# Patient Record
Sex: FEMALE | Race: WHITE | Employment: FULL TIME | URBAN - METROPOLITAN AREA
[De-identification: names, ages, dates, MRNs, and addresses within clinical notes are randomized per-mention and may not be internally consistent; named-entity substitution may affect disease eponyms.]

---

## 2018-08-19 ENCOUNTER — APPOINTMENT (OUTPATIENT)
Dept: ULTRASOUND IMAGING | Age: 63
DRG: 418 | End: 2018-08-19
Payer: COMMERCIAL

## 2018-08-19 ENCOUNTER — HOSPITAL ENCOUNTER (INPATIENT)
Age: 63
LOS: 6 days | Discharge: HOME | DRG: 418 | End: 2018-08-25
Attending: EMERGENCY MEDICINE | Admitting: INTERNAL MEDICINE
Payer: COMMERCIAL

## 2018-08-19 DIAGNOSIS — K80.50 COMMON BILE DUCT STONE: Primary | ICD-10-CM

## 2018-08-19 DIAGNOSIS — B17.9 ACUTE HEPATITIS: ICD-10-CM

## 2018-08-19 PROBLEM — R10.9 ABDOMINAL PAIN: Status: ACTIVE | Noted: 2018-08-19

## 2018-08-19 PROBLEM — K80.20 CALCULUS OF GALLBLADDER: Status: ACTIVE | Noted: 2018-08-19

## 2018-08-19 PROBLEM — R79.89 ABNORMAL LFTS: Status: ACTIVE | Noted: 2018-08-19

## 2018-08-19 LAB
A/G RATIO: 1 (ref 1.1–2.2)
ALBUMIN SERPL-MCNC: 3.9 G/DL (ref 3.4–5)
ALP BLD-CCNC: 115 U/L (ref 40–129)
ALT SERPL-CCNC: 285 U/L (ref 10–40)
ANION GAP SERPL CALCULATED.3IONS-SCNC: 17 MMOL/L (ref 3–16)
AST SERPL-CCNC: 523 U/L (ref 15–37)
BASOPHILS ABSOLUTE: 0.1 K/UL (ref 0–0.2)
BASOPHILS RELATIVE PERCENT: 0.5 %
BILIRUB SERPL-MCNC: 1.5 MG/DL (ref 0–1)
BILIRUBIN URINE: NEGATIVE
BLOOD, URINE: NEGATIVE
BUN BLDV-MCNC: 17 MG/DL (ref 7–20)
CALCIUM SERPL-MCNC: 9.5 MG/DL (ref 8.3–10.6)
CHLORIDE BLD-SCNC: 96 MMOL/L (ref 99–110)
CLARITY: CLEAR
CO2: 22 MMOL/L (ref 21–32)
COLOR: YELLOW
CREAT SERPL-MCNC: 0.6 MG/DL (ref 0.6–1.2)
EOSINOPHILS ABSOLUTE: 0 K/UL (ref 0–0.6)
EOSINOPHILS RELATIVE PERCENT: 0.1 %
GFR AFRICAN AMERICAN: >60
GFR NON-AFRICAN AMERICAN: >60
GLOBULIN: 3.8 G/DL
GLUCOSE BLD-MCNC: 151 MG/DL (ref 70–99)
GLUCOSE BLD-MCNC: 197 MG/DL (ref 70–99)
GLUCOSE BLD-MCNC: 239 MG/DL (ref 70–99)
GLUCOSE BLD-MCNC: 326 MG/DL (ref 70–99)
GLUCOSE URINE: >=1000 MG/DL
HCT VFR BLD CALC: 40 % (ref 36–48)
HEMOGLOBIN: 14 G/DL (ref 12–16)
KETONES, URINE: ABNORMAL MG/DL
LEUKOCYTE ESTERASE, URINE: NEGATIVE
LIPASE: 23 U/L (ref 13–60)
LYMPHOCYTES ABSOLUTE: 0.9 K/UL (ref 1–5.1)
LYMPHOCYTES RELATIVE PERCENT: 6.6 %
MCH RBC QN AUTO: 31.6 PG (ref 26–34)
MCHC RBC AUTO-ENTMCNC: 34.8 G/DL (ref 31–36)
MCV RBC AUTO: 90.7 FL (ref 80–100)
MICROSCOPIC EXAMINATION: ABNORMAL
MONOCYTES ABSOLUTE: 0.3 K/UL (ref 0–1.3)
MONOCYTES RELATIVE PERCENT: 2.4 %
NEUTROPHILS ABSOLUTE: 12.7 K/UL (ref 1.7–7.7)
NEUTROPHILS RELATIVE PERCENT: 90.4 %
NITRITE, URINE: NEGATIVE
PDW BLD-RTO: 13.7 % (ref 12.4–15.4)
PERFORMED ON: ABNORMAL
PH UA: 5.5
PLATELET # BLD: 305 K/UL (ref 135–450)
PMV BLD AUTO: 8.5 FL (ref 5–10.5)
POTASSIUM SERPL-SCNC: 3.9 MMOL/L (ref 3.5–5.1)
PROTEIN UA: NEGATIVE MG/DL
RBC # BLD: 4.41 M/UL (ref 4–5.2)
SODIUM BLD-SCNC: 135 MMOL/L (ref 136–145)
SPECIFIC GRAVITY UA: 1.01
TOTAL PROTEIN: 7.7 G/DL (ref 6.4–8.2)
URINE TYPE: ABNORMAL
UROBILINOGEN, URINE: 0.2 E.U./DL
WBC # BLD: 14.1 K/UL (ref 4–11)

## 2018-08-19 PROCEDURE — 85025 COMPLETE CBC W/AUTO DIFF WBC: CPT

## 2018-08-19 PROCEDURE — 6360000002 HC RX W HCPCS: Performed by: INTERNAL MEDICINE

## 2018-08-19 PROCEDURE — 6370000000 HC RX 637 (ALT 250 FOR IP): Performed by: INTERNAL MEDICINE

## 2018-08-19 PROCEDURE — 6360000002 HC RX W HCPCS: Performed by: EMERGENCY MEDICINE

## 2018-08-19 PROCEDURE — 76705 ECHO EXAM OF ABDOMEN: CPT

## 2018-08-19 PROCEDURE — S0028 INJECTION, FAMOTIDINE, 20 MG: HCPCS | Performed by: INTERNAL MEDICINE

## 2018-08-19 PROCEDURE — 1200000000 HC SEMI PRIVATE

## 2018-08-19 PROCEDURE — 96375 TX/PRO/DX INJ NEW DRUG ADDON: CPT

## 2018-08-19 PROCEDURE — 83036 HEMOGLOBIN GLYCOSYLATED A1C: CPT

## 2018-08-19 PROCEDURE — 94761 N-INVAS EAR/PLS OXIMETRY MLT: CPT

## 2018-08-19 PROCEDURE — 80053 COMPREHEN METABOLIC PANEL: CPT

## 2018-08-19 PROCEDURE — 96374 THER/PROPH/DIAG INJ IV PUSH: CPT

## 2018-08-19 PROCEDURE — 83690 ASSAY OF LIPASE: CPT

## 2018-08-19 PROCEDURE — 6360000002 HC RX W HCPCS: Performed by: NURSE PRACTITIONER

## 2018-08-19 PROCEDURE — 81003 URINALYSIS AUTO W/O SCOPE: CPT

## 2018-08-19 PROCEDURE — 80074 ACUTE HEPATITIS PANEL: CPT

## 2018-08-19 PROCEDURE — 2580000003 HC RX 258: Performed by: EMERGENCY MEDICINE

## 2018-08-19 PROCEDURE — 2500000003 HC RX 250 WO HCPCS: Performed by: INTERNAL MEDICINE

## 2018-08-19 PROCEDURE — 2580000003 HC RX 258: Performed by: INTERNAL MEDICINE

## 2018-08-19 PROCEDURE — 96361 HYDRATE IV INFUSION ADD-ON: CPT

## 2018-08-19 PROCEDURE — 99285 EMERGENCY DEPT VISIT HI MDM: CPT

## 2018-08-19 RX ORDER — LEVOTHYROXINE SODIUM 0.12 MG/1
125 TABLET ORAL DAILY
Status: DISCONTINUED | OUTPATIENT
Start: 2018-08-19 | End: 2018-08-25 | Stop reason: HOSPADM

## 2018-08-19 RX ORDER — LOSARTAN POTASSIUM 100 MG/1
100 TABLET ORAL DAILY
Status: DISCONTINUED | OUTPATIENT
Start: 2018-08-19 | End: 2018-08-25 | Stop reason: HOSPADM

## 2018-08-19 RX ORDER — MORPHINE SULFATE 4 MG/ML
4 INJECTION, SOLUTION INTRAMUSCULAR; INTRAVENOUS EVERY 30 MIN PRN
Status: DISCONTINUED | OUTPATIENT
Start: 2018-08-19 | End: 2018-08-19

## 2018-08-19 RX ORDER — METOPROLOL TARTRATE 50 MG/1
TABLET, FILM COATED ORAL
COMMUNITY
Start: 2018-05-17

## 2018-08-19 RX ORDER — 0.9 % SODIUM CHLORIDE 0.9 %
1000 INTRAVENOUS SOLUTION INTRAVENOUS ONCE
Status: COMPLETED | OUTPATIENT
Start: 2018-08-19 | End: 2018-08-19

## 2018-08-19 RX ORDER — DEXTROSE MONOHYDRATE 50 MG/ML
100 INJECTION, SOLUTION INTRAVENOUS PRN
Status: DISCONTINUED | OUTPATIENT
Start: 2018-08-19 | End: 2018-08-25 | Stop reason: HOSPADM

## 2018-08-19 RX ORDER — ONDANSETRON 2 MG/ML
4 INJECTION INTRAMUSCULAR; INTRAVENOUS EVERY 6 HOURS PRN
Status: DISCONTINUED | OUTPATIENT
Start: 2018-08-19 | End: 2018-08-25 | Stop reason: HOSPADM

## 2018-08-19 RX ORDER — SODIUM CHLORIDE 9 MG/ML
INJECTION, SOLUTION INTRAVENOUS CONTINUOUS
Status: DISCONTINUED | OUTPATIENT
Start: 2018-08-19 | End: 2018-08-20

## 2018-08-19 RX ORDER — SODIUM CHLORIDE 0.9 % (FLUSH) 0.9 %
10 SYRINGE (ML) INJECTION PRN
Status: DISCONTINUED | OUTPATIENT
Start: 2018-08-19 | End: 2018-08-25 | Stop reason: HOSPADM

## 2018-08-19 RX ORDER — ONDANSETRON 2 MG/ML
4 INJECTION INTRAMUSCULAR; INTRAVENOUS ONCE
Status: COMPLETED | OUTPATIENT
Start: 2018-08-19 | End: 2018-08-19

## 2018-08-19 RX ORDER — SODIUM CHLORIDE 0.9 % (FLUSH) 0.9 %
10 SYRINGE (ML) INJECTION EVERY 12 HOURS SCHEDULED
Status: DISCONTINUED | OUTPATIENT
Start: 2018-08-19 | End: 2018-08-25 | Stop reason: HOSPADM

## 2018-08-19 RX ORDER — ACYCLOVIR 800 MG/1
400 TABLET ORAL 3 TIMES DAILY
Status: CANCELLED | OUTPATIENT
Start: 2018-08-19

## 2018-08-19 RX ORDER — LORAZEPAM 2 MG/ML
1 INJECTION INTRAMUSCULAR ONCE
Status: DISCONTINUED | OUTPATIENT
Start: 2018-08-19 | End: 2018-08-19

## 2018-08-19 RX ORDER — DEXTROSE MONOHYDRATE 25 G/50ML
12.5 INJECTION, SOLUTION INTRAVENOUS PRN
Status: DISCONTINUED | OUTPATIENT
Start: 2018-08-19 | End: 2018-08-25 | Stop reason: HOSPADM

## 2018-08-19 RX ORDER — NICOTINE POLACRILEX 4 MG
15 LOZENGE BUCCAL PRN
Status: DISCONTINUED | OUTPATIENT
Start: 2018-08-19 | End: 2018-08-25 | Stop reason: HOSPADM

## 2018-08-19 RX ORDER — MECLIZINE HCL 12.5 MG/1
25 TABLET ORAL 3 TIMES DAILY PRN
Status: DISCONTINUED | OUTPATIENT
Start: 2018-08-19 | End: 2018-08-25 | Stop reason: HOSPADM

## 2018-08-19 RX ORDER — PRAVASTATIN SODIUM 20 MG
TABLET ORAL
Status: ON HOLD | COMMUNITY
Start: 2018-05-13 | End: 2018-08-21 | Stop reason: HOSPADM

## 2018-08-19 RX ORDER — MORPHINE SULFATE 2 MG/ML
2 INJECTION, SOLUTION INTRAMUSCULAR; INTRAVENOUS EVERY 4 HOURS PRN
Status: DISCONTINUED | OUTPATIENT
Start: 2018-08-19 | End: 2018-08-25 | Stop reason: HOSPADM

## 2018-08-19 RX ORDER — METOPROLOL TARTRATE 50 MG/1
50 TABLET, FILM COATED ORAL 2 TIMES DAILY
Status: DISCONTINUED | OUTPATIENT
Start: 2018-08-19 | End: 2018-08-25 | Stop reason: HOSPADM

## 2018-08-19 RX ORDER — LORAZEPAM 2 MG/ML
1 INJECTION INTRAMUSCULAR ONCE
Status: COMPLETED | OUTPATIENT
Start: 2018-08-20 | End: 2018-08-20

## 2018-08-19 RX ADMIN — SODIUM CHLORIDE: 9 INJECTION, SOLUTION INTRAVENOUS at 23:07

## 2018-08-19 RX ADMIN — METOPROLOL TARTRATE 50 MG: 50 TABLET ORAL at 20:39

## 2018-08-19 RX ADMIN — ENOXAPARIN SODIUM 40 MG: 40 INJECTION SUBCUTANEOUS at 14:50

## 2018-08-19 RX ADMIN — MORPHINE SULFATE 4 MG: 4 INJECTION INTRAVENOUS at 10:44

## 2018-08-19 RX ADMIN — INSULIN LISPRO 1 UNITS: 100 INJECTION, SOLUTION INTRAVENOUS; SUBCUTANEOUS at 23:07

## 2018-08-19 RX ADMIN — MORPHINE SULFATE 2 MG: 2 INJECTION, SOLUTION INTRAMUSCULAR; INTRAVENOUS at 22:13

## 2018-08-19 RX ADMIN — ONDANSETRON 4 MG: 2 INJECTION INTRAMUSCULAR; INTRAVENOUS at 10:44

## 2018-08-19 RX ADMIN — SODIUM CHLORIDE 1000 ML: 9 INJECTION, SOLUTION INTRAVENOUS at 10:44

## 2018-08-19 RX ADMIN — FAMOTIDINE 20 MG: 10 INJECTION, SOLUTION INTRAVENOUS at 14:51

## 2018-08-19 RX ADMIN — ONDANSETRON 4 MG: 2 SOLUTION INTRAMUSCULAR; INTRAVENOUS at 20:32

## 2018-08-19 RX ADMIN — SODIUM CHLORIDE: 9 INJECTION, SOLUTION INTRAVENOUS at 14:51

## 2018-08-19 RX ADMIN — FAMOTIDINE 20 MG: 10 INJECTION, SOLUTION INTRAVENOUS at 20:39

## 2018-08-19 ASSESSMENT — PAIN DESCRIPTION - PAIN TYPE
TYPE: ACUTE PAIN
TYPE: ACUTE PAIN

## 2018-08-19 ASSESSMENT — PAIN DESCRIPTION - LOCATION
LOCATION: ABDOMEN
LOCATION: ABDOMEN

## 2018-08-19 ASSESSMENT — PAIN DESCRIPTION - DESCRIPTORS
DESCRIPTORS: CRAMPING
DESCRIPTORS: ACHING;BURNING

## 2018-08-19 ASSESSMENT — PAIN SCALES - GENERAL
PAINLEVEL_OUTOF10: 0
PAINLEVEL_OUTOF10: 6
PAINLEVEL_OUTOF10: 3
PAINLEVEL_OUTOF10: 6
PAINLEVEL_OUTOF10: 0
PAINLEVEL_OUTOF10: 5

## 2018-08-19 ASSESSMENT — PAIN DESCRIPTION - FREQUENCY: FREQUENCY: CONTINUOUS

## 2018-08-19 ASSESSMENT — PAIN DESCRIPTION - ORIENTATION: ORIENTATION: MID;UPPER

## 2018-08-19 NOTE — ED PROVIDER NOTES
[facility]  ED  eMERGENCY dEPARTMENT eNCOUnter        Pt Name: Elise Monreal  MRN: [id number]  [place] 1955  Date of evaluation: 8/19/2018  Provider: Giulia Sanders MD  PCP: Enid Siemens, MD      CHIEF COMPLAINT       Chief Complaint   Patient presents with    Abdominal Pain     pt believes she's having a \"gallbladder attack\" symptoms of abdominal pain and vomiting started around 0500    Emesis       HISTORY OF PRESENT ILLNESS   (Location/Symptom, Timing/Onset, Context/Setting, Quality, Duration, Modifying Factors, Severity)  Note limiting factors. Elise Monreal is a 66 y.o. female  presents to the emergency department with complaint of Abdominal pain. Patient reports pain in the right upper quadrant and midepigastric region. Patient has history of gallstones. Patient's only abdominal surgeries were C-sections. Patient denies any fevers chills. Patient reports nausea with vomiting. Patient denies any change in stooling. Patient denies dysuria urgency or frequency. Patient has otherwise been well. Patient has diabetes with her last hemoglobin A1c at 7. Nursing Notes were all reviewed and agreed with or any disagreements were addressed  in the HPI.     REVIEW OF SYSTEMS    (2-9 systems for level 4, 10 or more for level 5)     Review of Systems  REVIEW OF SYSTEMS    Constitutional:  Denies fever, chills, weight loss or weakness   Eyes:  Denies photophobia or discharge   HENT:  Denies sore throat or ear pain   Respiratory:  Denies cough or shortness of breath   Cardiovascular:  Denies chest pain, palpitations or swelling   GI:  Denies abdominal pain, nausea, vomiting, or diarrhea   Musculoskeletal:  Denies back pain   Skin:  Denies rash   Neurologic:  Denies headache, focal weakness or sensory changes   Endocrine:  Denies polyuria or polydypsia   Lymphatic:  Denies swollen glands   Psychiatric:  Denies depression, suicidal ideation or homicidal ideation   All systems negative except as marked. Positives and Pertinent negatives as per HPI. Except as noted above in the ROS, all other systems were reviewed and negative. PAST MEDICAL HISTORY     Past Medical History:   Diagnosis Date    Diabetes mellitus ([address]     Hypertension     S/P thyroidectomy 2010    Left Throid Lobectomy    Sleep apnea     Thyroid disease     Vertigo          SURGICAL HISTORY       Past Surgical History:   Procedure Laterality Date     SECTION      X2    COLONOSCOPY      CYST REMOVAL  2012    upper back, excision mole lower posterior neck    CYSTOSCOPY  3/6/16    cystoscopy, right ureteral stent placement    PITUITARY SURGERY      benign pituitary tumor         CURRENT MEDICATIONS       Previous Medications    ACYCLOVIR (ZOVIRAX) 400 MG TABLET    Take 400 mg by mouth 3 times daily as needed. HYDROCHLOROTHIAZIDE (HYDRODIURIL) 12.5 MG TABLET    Take 25 mg by mouth daily. KETOROLAC (TORADOL) 10 MG TABLET    Take 1 tablet by mouth every 6 hours as needed for Pain    LEVOTHYROXINE SODIUM    by Does not apply route daily. LOSARTAN (COZAAR) 100 MG TABLET    Take 100 mg by mouth daily. MECLIZINE (ANTIVERT) 12.5 MG TABLET    Take 25 mg by mouth 3 times daily as needed. METFORMIN (GLUCOPHAGE) 1000 MG TABLET    Take 1,000 mg by mouth daily    METOPROLOL TARTRATE (LOPRESSOR) 50 MG TABLET    TAKE ONE TABLET BY MOUTH TWICE A DAY    MULTIPLE VITAMINS-CALCIUM (ONE-A-DAY WOMENS PO)    Take 1 tablet by mouth. PRAVASTATIN (PRAVACHOL) 20 MG TABLET    TAKE ONE TABLET BY MOUTH DAILY    TAMSULOSIN (FLOMAX) 0.4 MG CAPSULE    Take 1 capsule by mouth daily for 14 days    VITAMIN D PO    Take 1 tablet by mouth. ALLERGIES     Nsaids;  Penicillins; Streptomycin; and Sulfa antibiotics    FAMILY HISTORY       Family History   Problem Relation Age of Onset    Arthritis Mother     Thyroid Disease Mother     High Blood Pressure Father     Cancer Maternal Grandmother 8/19/18 1044)   ondansetron (ZOFRAN) injection 4 mg (4 mg Intravenous Given 8/19/18 1044)   0.9 % sodium chloride bolus (0 mLs Intravenous Stopped 8/19/18 1201)       The patient tolerated their visit well. The patient and / or the family were informed of the results of any tests, a time was given to answer questions. FINAL IMPRESSION      1. Common bile duct stone    2.  Acute hepatitis          DISPOSITION/PLAN   DISPOSITION Decision To Admit 08/19/2018 12:34:42 PM           (Please note that portions of this note were completed with a voice recognition program.  Efforts were made to edit the dictations but occasionally words are mis-transcribed.)    Grupo Gupta MD (electronically signed)              Jose Guzman MD  08/19/18 9210

## 2018-08-19 NOTE — H&P
mouth.   Yes Historical Provider, MD   VITAMIN D PO Take 1 tablet by mouth. Yes Historical Provider, MD   acyclovir (ZOVIRAX) 400 MG tablet Take 400 mg by mouth 3 times daily as needed. 12/16/10  Yes Historical Provider, MD   meclizine (ANTIVERT) 12.5 MG tablet Take 25 mg by mouth 3 times daily as needed. Yes Historical Provider, MD   hydrochlorothiazide (HYDRODIURIL) 12.5 MG tablet Take 25 mg by mouth daily. Yes Historical Provider, MD   tamsulosin (FLOMAX) 0.4 MG capsule Take 1 capsule by mouth daily for 14 days 10/16/15 10/30/15  [place]   ketorolac (TORADOL) 10 MG tablet Take 1 tablet by mouth every 6 hours as needed for Pain 10/16/15 10/20/15  CONRAD Schuster       Allergies:  Nsaids; Penicillins; Streptomycin; and Sulfa antibiotics    Social History:      The patient currently lives At home    TOBACCO:   reports that she has never smoked. She has never used smokeless tobacco.  ETOH:   reports that she does not drink alcohol. Family History:       Reviewed in detail and negative for DM, CAD, Cancer, CVA. Positive as follows:    Family History   Problem Relation Age of Onset    Arthritis Mother     Thyroid Disease Mother     High Blood Pressure Father     Cancer Maternal Grandmother     Thyroid Disease Maternal Grandmother     Diabetes Paternal Grandmother     High Blood Pressure Paternal Grandfather     Stroke Paternal Grandfather     Thyroid Disease Other        REVIEW OF SYSTEMS:   Pertinent positives as noted in the HPI. All other systems reviewed and negative. PHYSICAL EXAM PERFORMED:    BP (!) 148/70   Pulse 62   Temp 97.8 °F (36.6 °C) (Oral)   Resp 18   Ht 5' 2\" (1.575 m)   Wt 211 lb (95.7 kg)   LMP 12/16/2005   SpO2 99%   BMI 38.59 kg/m²     General appearance:  No apparent distress, appears stated age and cooperative. Obese  HEENT:  Normal cephalic, atraumatic without obvious deformity. Pupils equal, round, and reactive to light. Extra ocular muscles intact. Conjunctivae/corneas clear. Neck: Supple, with full range of motion. No jugular venous distention. Trachea midline. Respiratory:  Normal respiratory effort. Clear to auscultation, bilaterally without Rales/Wheezes/Rhonchi. Cardiovascular:  Regular rate and rhythm with normal S1/S2 without murmurs, rubs or gallops. Abdomen: Soft,  tender, non-distended with normal bowel sounds. Musculoskeletal:  No clubbing, cyanosis or edema bilaterally. Full range of motion without deformity. Skin: Skin color, texture, turgor normal.  No rashes or lesions. Neurologic:  Neurovascularly intact without any focal sensory/motor deficits. Cranial nerves: II-XII intact, grossly non-focal.  Psychiatric:  Alert and oriented, thought content appropriate, normal insight  Capillary Refill: Brisk,< 3 seconds   Peripheral Pulses: +2 palpable, equal bilaterally       Labs:     Recent Labs      08/19/18   1030   WBC  14.1*   HGB  14.0   HCT  40.0   PLT  305     Recent Labs      08/19/18   1030   NA  135*   K  3.9   CL  96*   CO2  22   BUN  17   CREATININE  0.6   CALCIUM  9.5     Recent Labs      08/19/18   1030   AST  523*   ALT  285*   BILITOT  1.5*   ALKPHOS  115       Urinalysis:      Lab Results   Component Value Date    NITRU Negative 08/19/2018    WBCUA  03/06/2016    BACTERIA 4+ 03/06/2016    RBCUA >100 03/06/2016    BLOODU Negative 08/19/2018    SPECGRAV 1.015 08/19/2018    GLUCOSEU >=1000 08/19/2018       Radiology:     CXR: I have reviewed the CXR with the following interpretation: N/A  EKG:  I have reviewed the EKG with the following interpretation: N/A    US GALLBLADDER RUQ   Preliminary Result   Mildly dilated common bile duct measuring 9 mm in diameter. No evidence of   intrahepatic biliary ductal dilatation. This may be secondary to a common   bile duct stone. However, recommend correlation with LFTs. Cholelithiasis without evidence of acute cholecystitis.       Right intrarenal stones without evidence of

## 2018-08-20 ENCOUNTER — APPOINTMENT (OUTPATIENT)
Dept: MRI IMAGING | Age: 63
DRG: 418 | End: 2018-08-20
Payer: COMMERCIAL

## 2018-08-20 LAB
A/G RATIO: 1 (ref 1.1–2.2)
ACETAMINOPHEN LEVEL: <5 UG/ML (ref 10–30)
ALBUMIN SERPL-MCNC: 3.3 G/DL (ref 3.4–5)
ALP BLD-CCNC: 132 U/L (ref 40–129)
ALT SERPL-CCNC: 759 U/L (ref 10–40)
ANION GAP SERPL CALCULATED.3IONS-SCNC: 13 MMOL/L (ref 3–16)
AST SERPL-CCNC: 886 U/L (ref 15–37)
BASOPHILS ABSOLUTE: 0.1 K/UL (ref 0–0.2)
BASOPHILS RELATIVE PERCENT: 1 %
BILIRUB SERPL-MCNC: 2.5 MG/DL (ref 0–1)
BUN BLDV-MCNC: 13 MG/DL (ref 7–20)
CALCIUM SERPL-MCNC: 8.5 MG/DL (ref 8.3–10.6)
CHLORIDE BLD-SCNC: 104 MMOL/L (ref 99–110)
CO2: 24 MMOL/L (ref 21–32)
CREAT SERPL-MCNC: <0.5 MG/DL (ref 0.6–1.2)
EOSINOPHILS ABSOLUTE: 0 K/UL (ref 0–0.6)
EOSINOPHILS RELATIVE PERCENT: 0.6 %
ESTIMATED AVERAGE GLUCOSE: 159.9 MG/DL
GFR AFRICAN AMERICAN: >60
GFR NON-AFRICAN AMERICAN: >60
GLOBULIN: 3.2 G/DL
GLUCOSE BLD-MCNC: 118 MG/DL (ref 70–99)
GLUCOSE BLD-MCNC: 149 MG/DL (ref 70–99)
GLUCOSE BLD-MCNC: 155 MG/DL (ref 70–99)
GLUCOSE BLD-MCNC: 155 MG/DL (ref 70–99)
GLUCOSE BLD-MCNC: 174 MG/DL (ref 70–99)
GLUCOSE BLD-MCNC: 179 MG/DL (ref 70–99)
HAV IGM SER IA-ACNC: NORMAL
HBA1C MFR BLD: 7.2 %
HCT VFR BLD CALC: 33.6 % (ref 36–48)
HEMOGLOBIN: 11.8 G/DL (ref 12–16)
HEPATITIS B CORE IGM ANTIBODY: NORMAL
HEPATITIS B SURFACE ANTIGEN INTERPRETATION: NORMAL
HEPATITIS C ANTIBODY INTERPRETATION: NORMAL
INR BLD: 1.1 (ref 0.86–1.14)
LYMPHOCYTES ABSOLUTE: 2.4 K/UL (ref 1–5.1)
LYMPHOCYTES RELATIVE PERCENT: 31.8 %
MAGNESIUM: 1.7 MG/DL (ref 1.8–2.4)
MCH RBC QN AUTO: 31.5 PG (ref 26–34)
MCHC RBC AUTO-ENTMCNC: 35.2 G/DL (ref 31–36)
MCV RBC AUTO: 89.5 FL (ref 80–100)
MONOCYTES ABSOLUTE: 0.4 K/UL (ref 0–1.3)
MONOCYTES RELATIVE PERCENT: 5.2 %
NEUTROPHILS ABSOLUTE: 4.6 K/UL (ref 1.7–7.7)
NEUTROPHILS RELATIVE PERCENT: 61.4 %
PDW BLD-RTO: 13.9 % (ref 12.4–15.4)
PERFORMED ON: ABNORMAL
PLATELET # BLD: 285 K/UL (ref 135–450)
PMV BLD AUTO: 7.9 FL (ref 5–10.5)
POTASSIUM REFLEX MAGNESIUM: 3.1 MMOL/L (ref 3.5–5.1)
PROTHROMBIN TIME: 12.5 SEC (ref 9.8–13)
RBC # BLD: 3.76 M/UL (ref 4–5.2)
SODIUM BLD-SCNC: 141 MMOL/L (ref 136–145)
TOTAL PROTEIN: 6.5 G/DL (ref 6.4–8.2)
TSH REFLEX: 1.24 UIU/ML (ref 0.27–4.2)
WBC # BLD: 7.5 K/UL (ref 4–11)

## 2018-08-20 PROCEDURE — 99253 IP/OBS CNSLTJ NEW/EST LOW 45: CPT | Performed by: SURGERY

## 2018-08-20 PROCEDURE — 84155 ASSAY OF PROTEIN SERUM: CPT

## 2018-08-20 PROCEDURE — 6360000002 HC RX W HCPCS: Performed by: NURSE PRACTITIONER

## 2018-08-20 PROCEDURE — 86664 EPSTEIN-BARR NUCLEAR ANTIGEN: CPT

## 2018-08-20 PROCEDURE — 83516 IMMUNOASSAY NONANTIBODY: CPT

## 2018-08-20 PROCEDURE — G0480 DRUG TEST DEF 1-7 CLASSES: HCPCS

## 2018-08-20 PROCEDURE — 80053 COMPREHEN METABOLIC PANEL: CPT

## 2018-08-20 PROCEDURE — 87529 HSV DNA AMP PROBE: CPT

## 2018-08-20 PROCEDURE — 86663 EPSTEIN-BARR ANTIBODY: CPT

## 2018-08-20 PROCEDURE — 94761 N-INVAS EAR/PLS OXIMETRY MLT: CPT

## 2018-08-20 PROCEDURE — S0028 INJECTION, FAMOTIDINE, 20 MG: HCPCS | Performed by: INTERNAL MEDICINE

## 2018-08-20 PROCEDURE — 83735 ASSAY OF MAGNESIUM: CPT

## 2018-08-20 PROCEDURE — 86039 ANTINUCLEAR ANTIBODIES (ANA): CPT

## 2018-08-20 PROCEDURE — 85025 COMPLETE CBC W/AUTO DIFF WBC: CPT

## 2018-08-20 PROCEDURE — 86645 CMV ANTIBODY IGM: CPT

## 2018-08-20 PROCEDURE — 86665 EPSTEIN-BARR CAPSID VCA: CPT

## 2018-08-20 PROCEDURE — 36415 COLL VENOUS BLD VENIPUNCTURE: CPT

## 2018-08-20 PROCEDURE — 84165 PROTEIN E-PHORESIS SERUM: CPT

## 2018-08-20 PROCEDURE — 86038 ANTINUCLEAR ANTIBODIES: CPT

## 2018-08-20 PROCEDURE — 74181 MRI ABDOMEN W/O CONTRAST: CPT

## 2018-08-20 PROCEDURE — 84443 ASSAY THYROID STIM HORMONE: CPT

## 2018-08-20 PROCEDURE — 1200000000 HC SEMI PRIVATE

## 2018-08-20 PROCEDURE — 85610 PROTHROMBIN TIME: CPT

## 2018-08-20 PROCEDURE — 2580000003 HC RX 258: Performed by: INTERNAL MEDICINE

## 2018-08-20 PROCEDURE — 2500000003 HC RX 250 WO HCPCS: Performed by: INTERNAL MEDICINE

## 2018-08-20 PROCEDURE — 6360000002 HC RX W HCPCS: Performed by: INTERNAL MEDICINE

## 2018-08-20 PROCEDURE — 6370000000 HC RX 637 (ALT 250 FOR IP): Performed by: NURSE PRACTITIONER

## 2018-08-20 PROCEDURE — 6370000000 HC RX 637 (ALT 250 FOR IP): Performed by: INTERNAL MEDICINE

## 2018-08-20 RX ORDER — IBUPROFEN 400 MG/1
400 TABLET ORAL ONCE
Status: COMPLETED | OUTPATIENT
Start: 2018-08-20 | End: 2018-08-20

## 2018-08-20 RX ORDER — SODIUM CHLORIDE 9 MG/ML
INJECTION, SOLUTION INTRAVENOUS CONTINUOUS
Status: DISCONTINUED | OUTPATIENT
Start: 2018-08-20 | End: 2018-08-25 | Stop reason: HOSPADM

## 2018-08-20 RX ORDER — DEXTROSE, SODIUM CHLORIDE, AND POTASSIUM CHLORIDE 5; .45; .15 G/100ML; G/100ML; G/100ML
INJECTION INTRAVENOUS CONTINUOUS
Status: DISCONTINUED | OUTPATIENT
Start: 2018-08-20 | End: 2018-08-20

## 2018-08-20 RX ORDER — MAGNESIUM SULFATE IN WATER 40 MG/ML
2 INJECTION, SOLUTION INTRAVENOUS ONCE
Status: COMPLETED | OUTPATIENT
Start: 2018-08-20 | End: 2018-08-20

## 2018-08-20 RX ADMIN — SODIUM CHLORIDE: 9 INJECTION, SOLUTION INTRAVENOUS at 17:47

## 2018-08-20 RX ADMIN — INSULIN LISPRO 1 UNITS: 100 INJECTION, SOLUTION INTRAVENOUS; SUBCUTANEOUS at 10:54

## 2018-08-20 RX ADMIN — INSULIN LISPRO 1 UNITS: 100 INJECTION, SOLUTION INTRAVENOUS; SUBCUTANEOUS at 06:31

## 2018-08-20 RX ADMIN — SODIUM CHLORIDE, PRESERVATIVE FREE 10 ML: 5 INJECTION INTRAVENOUS at 08:01

## 2018-08-20 RX ADMIN — LOSARTAN POTASSIUM 100 MG: 100 TABLET, FILM COATED ORAL at 10:53

## 2018-08-20 RX ADMIN — SODIUM CHLORIDE: 9 INJECTION, SOLUTION INTRAVENOUS at 06:04

## 2018-08-20 RX ADMIN — METOPROLOL TARTRATE 50 MG: 50 TABLET ORAL at 10:53

## 2018-08-20 RX ADMIN — FAMOTIDINE 20 MG: 10 INJECTION, SOLUTION INTRAVENOUS at 20:06

## 2018-08-20 RX ADMIN — IBUPROFEN 400 MG: 400 TABLET ORAL at 20:04

## 2018-08-20 RX ADMIN — SODIUM CHLORIDE, PRESERVATIVE FREE 10 ML: 5 INJECTION INTRAVENOUS at 20:06

## 2018-08-20 RX ADMIN — METOPROLOL TARTRATE 50 MG: 50 TABLET ORAL at 20:06

## 2018-08-20 RX ADMIN — POTASSIUM CHLORIDE, DEXTROSE MONOHYDRATE AND SODIUM CHLORIDE: 150; 5; 450 INJECTION, SOLUTION INTRAVENOUS at 13:27

## 2018-08-20 RX ADMIN — MAGNESIUM SULFATE HEPTAHYDRATE 2 G: 40 INJECTION, SOLUTION INTRAVENOUS at 17:47

## 2018-08-20 RX ADMIN — LORAZEPAM 1 MG: 2 INJECTION, SOLUTION INTRAMUSCULAR; INTRAVENOUS at 08:01

## 2018-08-20 RX ADMIN — INSULIN LISPRO 1 UNITS: 100 INJECTION, SOLUTION INTRAVENOUS; SUBCUTANEOUS at 21:05

## 2018-08-20 ASSESSMENT — PAIN SCALES - GENERAL
PAINLEVEL_OUTOF10: 4
PAINLEVEL_OUTOF10: 4

## 2018-08-20 ASSESSMENT — PAIN DESCRIPTION - DESCRIPTORS: DESCRIPTORS: ACHING

## 2018-08-20 ASSESSMENT — PAIN DESCRIPTION - PAIN TYPE: TYPE: ACUTE PAIN

## 2018-08-20 ASSESSMENT — PAIN DESCRIPTION - LOCATION: LOCATION: HEAD

## 2018-08-20 NOTE — PROGRESS NOTES
Hospitalist Progress Note      PCP: Apurva Little MD    Date of Admission: 8/19/2018    Chief Complaint: Abdominal pain    Hospital Course:      Subjective:   Patient is up in chair, comfortable, not in distress. Denies any abdominal pain, nausea or vomiting. No new event overnight noted. Medications:  Reviewed    Infusion Medications    dextrose 5% and 0.45% NaCl with KCl 20 mEq      dextrose       Scheduled Medications    losartan  100 mg Oral Daily    metoprolol tartrate  50 mg Oral BID    sodium chloride flush  10 mL Intravenous 2 times per day    enoxaparin  40 mg Subcutaneous Daily    famotidine (PEPCID) injection  20 mg Intravenous BID    insulin lispro  0-6 Units Subcutaneous Q4H    levothyroxine  125 mcg Oral Daily     PRN Meds: meclizine, sodium chloride flush, magnesium hydroxide, ondansetron, glucose, dextrose, glucagon (rDNA), dextrose, morphine      Intake/Output Summary (Last 24 hours) at 08/20/18 1222  Last data filed at 08/20/18 7512   Gross per 24 hour   Intake          1970.83 ml   Output              800 ml   Net          1170.83 ml       Physical Exam Performed:    BP (!) 167/68   Pulse 70   Temp 97.6 °F (36.4 °C) (Oral)   Resp 18   Ht 5' 2\" (1.575 m)   Wt 211 lb (95.7 kg)   LMP 12/16/2005   SpO2 97%   BMI 38.59 kg/m²     General appearance: No apparent distress, appears stated age and cooperative. Obese  HEENT: Pupils equal, round, and reactive to light. Conjunctivae/corneas clear. Neck: Supple, with full range of motion. No jugular venous distention. Trachea midline. Respiratory:  Normal respiratory effort. Clear to auscultation, bilaterally without Rales/Wheezes/Rhonchi. Cardiovascular: Regular rate and rhythm with normal S1/S2 without murmurs, rubs or gallops. Abdomen: Soft, non-tender, non-distended with normal bowel sounds. Musculoskeletal: No clubbing, cyanosis or edema bilaterally. Full range of motion without deformity.   Skin: Skin color, texture, turgor normal.  No rashes or lesions. Neurologic:  Neurovascularly intact without any focal sensory/motor deficits. Cranial nerves: II-XII intact, grossly non-focal.  Psychiatric: Alert and oriented, thought content appropriate, normal insight  Capillary Refill: Brisk,< 3 seconds   Peripheral Pulses: +2 palpable, equal bilaterally       Labs:   Recent Labs      08/19/18   1030  08/20/18   0623   WBC  14.1*  7.5   HGB  14.0  11.8*   HCT  40.0  33.6*   PLT  305  285     Recent Labs      08/19/18   1030  08/20/18   0623   NA  135*  141   K  3.9  3.1*   CL  96*  104   CO2  22  24   BUN  17  13   CREATININE  0.6  <0.5*   CALCIUM  9.5  8.5     Recent Labs      08/19/18   1030  08/20/18   0623   AST  523*  886*   ALT  285*  759*   BILITOT  1.5*  2.5*   ALKPHOS  115  132*     Recent Labs      08/20/18   0933   INR  1.10     No results for input(s): CKTOTAL, TROPONINI in the last 72 hours. Urinalysis:    Lab Results   Component Value Date    NITRU Negative 08/19/2018    WBCUA  03/06/2016    BACTERIA 4+ 03/06/2016    RBCUA >100 03/06/2016    BLOODU Negative 08/19/2018    SPECGRAV 1.015 08/19/2018    GLUCOSEU >=1000 08/19/2018       Radiology:  US GALLBLADDER RUQ   Final Result   Mildly dilated common bile duct measuring 9 mm in diameter. No evidence of   intrahepatic biliary ductal dilatation. This may be secondary to a common   bile duct stone. However, recommend correlation with LFTs. Cholelithiasis without evidence of acute cholecystitis. Right intrarenal stones without evidence of hydronephrosis. Hepatic steatosis. MRI ABDOMEN WO CONTRAST MRCP   Final Result   1. Cholelithiasis. No evidence of acute inflammation to suggest acute   cholecystitis. 2. Normal MRCP. No current evidence of biliary ductal dilatation. 3. Hepatic steatosis and left adrenal adenoma incidentally observed.                  Assessment/Plan:    Active Hospital Problems    Diagnosis Date Noted    Abdominal pain [R10.9] 08/19/2018     Priority: High    Common bile duct stone [K80.50]     Calculus of gallbladder [K80.20] 08/19/2018    Abnormal LFTs [R94.5] 08/19/2018    Obesity [E66.9] 03/06/2016    Hypothyroid [E03.9] 03/06/2016    DM (diabetes mellitus) ([facility] [address] [E11.9] 03/06/2016     Acute of no pain, unknown etiology, possible due to choledocholithiasis and cholelithiasis, admit to MedSurg, keep nothing by mouth, symptomatic management with when necessary morphine, GI and general surgery consulted. Clinically improving, continue to monitor.     Abnormal LFT, alkaline phosphatase normal with elevated ALT and AST, ultrasound showed an enlarged common bile duct but no stone noted, check MRCP, follow LFT-worsening, acute hepatitis panel ordered, GI consulted, will follow. Acute hepatitis panel were normal, MRCP noted showing cholelithiasis but no choledocholithiasis, input from Gen. surgery noted, input from GI is pending.     Cholelithiasis without evidence of infection, see plan as above, general surgery consulted, will monitor.     Diabetes mellitus type 2, uncontrolled, started on insulin sliding scale, monitor blood sugar.     Morbid Obesity complicating assessment and treatment. Placing patient at risk for multiple co-morbidities as well as early death and contributing to the patient's presentation. Counseled on weight loss. Body mass index is 38.59 kg/m².     DVT Prophylaxis: Lovenox  Diet: Diet NPO Effective Now  Code Status: Full Code    PT/OT Eval Status: Ambulatory    Dispo - in 2-4 days    Vivian Lui MD

## 2018-08-20 NOTE — CARE COORDINATION
Chart reviewed by  for potential needs at discharge. Pt admitted thru ER on 8/19 with cholelithiasis and enlarged common bile duct. Per nsg documentation, pt is in MRI this morning. Pt being followed by internal medicine. Consults pending with GI and general surgery. No immediate needs identified by this . Please consult case mgmt if needs arise.      Jaqueline Pizano, RN DCP

## 2018-08-20 NOTE — CONSULTS
GI Consult Note      Reason for Consult:  Choledocholithiasis  Requesting Physician:  Darion Leos  Gastroenterologist: Froilan Frias COMPLAINT:  Abdominal pain    History Obtained From:  patient, electronic medical record    HISTORY OF PRESENT ILLNESS:                The patient is a 66 y.o. female with significant past medical history of DM, HTN and thyroid disease who presents with acute onset epigastric pain associated with nausea and emesis. In ED LFTs were noted to be abnormal.  RUQ US demonstrated a dilated CBD w/o IHD in the setting of cholelithiasis. MRCP, however, did not demonstrate the presence of biliary dilatation or bile duct filling defects. LFTs continue to rise though she feels better. Denies personal or FHx of liver disease. There have been no medicationi or supplement changes.   Denies ETOH    Past Medical History:        Diagnosis Date    Diabetes mellitus ([address]     Hypertension     S/P thyroidectomy 2010    Left Throid Lobectomy    Sleep apnea     Thyroid disease     Vertigo      Past Surgical History:        Procedure Laterality Date     SECTION      X2    COLONOSCOPY      CYST REMOVAL  2012    upper back, excision mole lower posterior neck    CYSTOSCOPY  3/6/16    cystoscopy, right ureteral stent placement    PITUITARY SURGERY      benign pituitary tumor     Current Medications:    Current Facility-Administered Medications: dextrose 5 % and 0.45 % NaCl with KCl 20 mEq infusion, , Intravenous, Continuous  magnesium sulfate 2 g in 50 mL IVPB premix, 2 g, Intravenous, Once  losartan (COZAAR) tablet 100 mg, 100 mg, Oral, Daily  metoprolol tartrate (LOPRESSOR) tablet 50 mg, 50 mg, Oral, BID  meclizine (ANTIVERT) tablet 25 mg, 25 mg, Oral, TID PRN  sodium chloride flush 0.9 % injection 10 mL, 10 mL, Intravenous, 2 times per day  sodium chloride flush 0.9 % injection 10 mL, 10 mL, Intravenous, PRN  magnesium hydroxide (MILK OF MAGNESIA) 400 MG/5ML suspension 30 mL, 30 mL,

## 2018-08-20 NOTE — CONSULTS
Department of General Surgery Consult    PATIENT NAME: Moira Penny OF BIRTH: 1955    ADMISSION DATE: 2018 10:04 AM      TODAY'S DATE: 2018    Reason for Consult:  Gallstones    Chief Complaint: Abdominal pain    Requesting Physician:  Megan Carter    HISTORY OF PRESENT ILLNESS:              The patient is a 66 y.o. female who presents with abdominal pain that started yesterday. She has never had this pain before. It is sharp and crampy in the epigastrium and RUQ. She has had associated nausea and vomiting. Denies fever or chills. Has known history of gallstones. She feels better this morning.     Past Medical History:        Diagnosis Date    [facility] mellitus [facility] [address]     Hypertension     S/P thyroidectomy 2010    Left Throid Lobectomy    Sleep apnea     Thyroid disease     Vertigo        Past Surgical History:        Procedure Laterality Date     SECTION      X2    COLONOSCOPY      CYST REMOVAL  2012    upper back, excision mole lower posterior neck    CYSTOSCOPY  3/6/16    cystoscopy, right ureteral stent placement    PITUITARY SURGERY      benign pituitary tumor       Current Medications:   Current Facility-Administered Medications: dextrose 5 % and 0.45 % NaCl with KCl 20 mEq infusion, , Intravenous, Continuous  losartan (COZAAR) tablet 100 mg, 100 mg, Oral, Daily  metoprolol tartrate (LOPRESSOR) tablet 50 mg, 50 mg, Oral, BID  meclizine (ANTIVERT) tablet 25 mg, 25 mg, Oral, TID PRN  sodium chloride flush 0.9 % injection 10 mL, 10 mL, Intravenous, 2 times per day  sodium chloride flush 0.9 % injection 10 mL, 10 mL, Intravenous, PRN  magnesium hydroxide (MILK OF MAGNESIA) 400 MG/5ML suspension 30 mL, 30 mL, Oral, Daily PRN  ondansetron (ZOFRAN) injection 4 mg, 4 mg, Intravenous, Q6H PRN  enoxaparin (LOVENOX) injection 40 mg, 40 mg, Subcutaneous, Daily  glucose (GLUTOSE) 40 % oral gel 15 g, 15 g, Oral, PRN  dextrose 50 % solution 12.5 g, 12.5 g, Intravenous, PRN  glucagon (rDNA) injection 1 mg, 1 mg, Intramuscular, PRN  dextrose 5 % solution, 100 mL/hr, Intravenous, PRN  famotidine (PEPCID) injection 20 mg, 20 mg, Intravenous, BID  insulin lispro (HUMALOG) injection vial 0-6 Units, 0-6 Units, Subcutaneous, Q4H  levothyroxine (SYNTHROID) tablet 125 mcg, 125 mcg, Oral, Daily  morphine injection 2 mg, 2 mg, Intravenous, Q4H PRN  Prior to Admission medications    Medication Sig Start Date End Date Taking? Authorizing Provider   metoprolol tartrate (LOPRESSOR) 50 MG tablet TAKE ONE TABLET BY MOUTH TWICE A DAY 5/17/18  Yes Historical Provider, MD   pravastatin (PRAVACHOL) 20 MG tablet TAKE ONE TABLET BY MOUTH DAILY 5/13/18  Yes Historical Provider, MD   metFORMIN (GLUCOPHAGE) 1000 MG tablet Take 1,000 mg by mouth daily   Yes Historical Provider, MD   LEVOTHYROXINE SODIUM by Does not apply route daily. Yes Historical Provider, MD   losartan (COZAAR) 100 MG tablet Take 100 mg by mouth daily. Yes Historical Provider, MD   Multiple Vitamins-Calcium (ONE-A-DAY WOMENS PO) Take 1 tablet by mouth. Yes Historical Provider, MD   VITAMIN D PO Take 1 tablet by mouth. Yes Historical Provider, MD   acyclovir (ZOVIRAX) 400 MG tablet Take 400 mg by mouth 3 times daily as needed. 12/16/10  Yes Historical Provider, MD   meclizine (ANTIVERT) 12.5 MG tablet Take 25 mg by mouth 3 times daily as needed. Yes Historical Provider, MD   hydrochlorothiazide (HYDRODIURIL) 12.5 MG tablet Take 25 mg by mouth daily. Yes Historical Provider, MD   tamsulosin (FLOMAX) 0.4 MG capsule Take 1 capsule by mouth daily for 14 days 10/16/15 10/30/15  CONRAD Ellis   ketorolac (TORADOL) 10 MG tablet Take 1 tablet by mouth every 6 hours as needed for Pain 10/16/15 10/20/15  CONRAD Ellis        Allergies:  Amlodipine besylate; Naproxen sodium; Nsaids; Penicillins; Streptomycin; and Sulfa antibiotics    Social History:   TOBACCO:   reports that she has never smoked.  She has never used smokeless

## 2018-08-21 LAB
ALBUMIN SERPL-MCNC: 3.5 G/DL (ref 3.4–5)
ALP BLD-CCNC: 141 U/L (ref 40–129)
ALT SERPL-CCNC: 515 U/L (ref 10–40)
ANA INTERPRETATION: ABNORMAL
ANA TITER: ABNORMAL
ANION GAP SERPL CALCULATED.3IONS-SCNC: 12 MMOL/L (ref 3–16)
ANTI-NUCLEAR ANTIBODY (ANA): POSITIVE
AST SERPL-CCNC: 371 U/L (ref 15–37)
BILIRUB SERPL-MCNC: 1 MG/DL (ref 0–1)
BILIRUBIN DIRECT: 0.5 MG/DL (ref 0–0.3)
BILIRUBIN, INDIRECT: 0.5 MG/DL (ref 0–1)
BUN BLDV-MCNC: 11 MG/DL (ref 7–20)
CALCIUM SERPL-MCNC: 8.6 MG/DL (ref 8.3–10.6)
CHLORIDE BLD-SCNC: 106 MMOL/L (ref 99–110)
CO2: 25 MMOL/L (ref 21–32)
CREAT SERPL-MCNC: 0.6 MG/DL (ref 0.6–1.2)
CYTOMEGALOVIRUS IGM ANTIBODY: <8 AU/ML
EPSTEIN BARR VIRUS NUCLEAR AB IGG: <3 U/ML (ref 0–21.9)
EPSTEIN-BARR EARLY ANTIGEN ANTIBODY: <5 U/ML (ref 0–10.9)
EPSTEIN-BARR VCA IGG: <10 U/ML (ref 0–21.9)
EPSTEIN-BARR VCA IGM: <10 U/ML (ref 0–43.9)
F-ACTIN AB IGG: 6 UNITS (ref 0–19)
GFR AFRICAN AMERICAN: >60
GFR NON-AFRICAN AMERICAN: >60
GLUCOSE BLD-MCNC: 107 MG/DL (ref 70–99)
GLUCOSE BLD-MCNC: 154 MG/DL (ref 70–99)
GLUCOSE BLD-MCNC: 168 MG/DL (ref 70–99)
GLUCOSE BLD-MCNC: 171 MG/DL (ref 70–99)
GLUCOSE BLD-MCNC: 214 MG/DL (ref 70–99)
HCT VFR BLD CALC: 35.9 % (ref 36–48)
HEMOGLOBIN: 12.5 G/DL (ref 12–16)
MCH RBC QN AUTO: 31.2 PG (ref 26–34)
MCHC RBC AUTO-ENTMCNC: 34.8 G/DL (ref 31–36)
MCV RBC AUTO: 89.8 FL (ref 80–100)
PDW BLD-RTO: 13.6 % (ref 12.4–15.4)
PERFORMED ON: ABNORMAL
PHOSPHORUS: 2.5 MG/DL (ref 2.5–4.9)
PLATELET # BLD: 285 K/UL (ref 135–450)
PMV BLD AUTO: 8.1 FL (ref 5–10.5)
POTASSIUM SERPL-SCNC: 3.5 MMOL/L (ref 3.5–5.1)
RBC # BLD: 4 M/UL (ref 4–5.2)
SODIUM BLD-SCNC: 143 MMOL/L (ref 136–145)
TOTAL PROTEIN: 6.7 G/DL (ref 6.4–8.2)
WBC # BLD: 6.5 K/UL (ref 4–11)

## 2018-08-21 PROCEDURE — 1200000000 HC SEMI PRIVATE

## 2018-08-21 PROCEDURE — 6370000000 HC RX 637 (ALT 250 FOR IP): Performed by: INTERNAL MEDICINE

## 2018-08-21 PROCEDURE — 80076 HEPATIC FUNCTION PANEL: CPT

## 2018-08-21 PROCEDURE — 85027 COMPLETE CBC AUTOMATED: CPT

## 2018-08-21 PROCEDURE — 94761 N-INVAS EAR/PLS OXIMETRY MLT: CPT

## 2018-08-21 PROCEDURE — 6370000000 HC RX 637 (ALT 250 FOR IP): Performed by: NURSE PRACTITIONER

## 2018-08-21 PROCEDURE — APPSS30 APP SPLIT SHARED TIME 16-30 MINUTES: Performed by: CLINICAL NURSE SPECIALIST

## 2018-08-21 PROCEDURE — 2500000003 HC RX 250 WO HCPCS: Performed by: INTERNAL MEDICINE

## 2018-08-21 PROCEDURE — 80048 BASIC METABOLIC PNL TOTAL CA: CPT

## 2018-08-21 PROCEDURE — 2580000003 HC RX 258: Performed by: INTERNAL MEDICINE

## 2018-08-21 PROCEDURE — S0028 INJECTION, FAMOTIDINE, 20 MG: HCPCS | Performed by: INTERNAL MEDICINE

## 2018-08-21 PROCEDURE — 36415 COLL VENOUS BLD VENIPUNCTURE: CPT

## 2018-08-21 PROCEDURE — 84100 ASSAY OF PHOSPHORUS: CPT

## 2018-08-21 PROCEDURE — 6360000002 HC RX W HCPCS: Performed by: NURSE PRACTITIONER

## 2018-08-21 PROCEDURE — 99231 SBSQ HOSP IP/OBS SF/LOW 25: CPT | Performed by: SURGERY

## 2018-08-21 RX ORDER — LEVOTHYROXINE SODIUM 0.12 MG/1
125 TABLET ORAL DAILY
Qty: 30 TABLET | Refills: 3
Start: 2018-08-22

## 2018-08-21 RX ORDER — HYDRALAZINE HYDROCHLORIDE 25 MG/1
25 TABLET, FILM COATED ORAL EVERY 8 HOURS SCHEDULED
Status: DISCONTINUED | OUTPATIENT
Start: 2018-08-21 | End: 2018-08-23

## 2018-08-21 RX ORDER — HYDROCHLOROTHIAZIDE 25 MG/1
25 TABLET ORAL DAILY
Status: DISCONTINUED | OUTPATIENT
Start: 2018-08-21 | End: 2018-08-25 | Stop reason: HOSPADM

## 2018-08-21 RX ORDER — OXYCODONE HYDROCHLORIDE 5 MG/1
5 TABLET ORAL EVERY 4 HOURS PRN
Status: DISCONTINUED | OUTPATIENT
Start: 2018-08-21 | End: 2018-08-25 | Stop reason: HOSPADM

## 2018-08-21 RX ADMIN — HYDRALAZINE HYDROCHLORIDE 25 MG: 25 TABLET, FILM COATED ORAL at 22:01

## 2018-08-21 RX ADMIN — HYDROCHLOROTHIAZIDE 25 MG: 25 TABLET ORAL at 16:20

## 2018-08-21 RX ADMIN — METOPROLOL TARTRATE 50 MG: 50 TABLET ORAL at 08:36

## 2018-08-21 RX ADMIN — INSULIN LISPRO 1 UNITS: 100 INJECTION, SOLUTION INTRAVENOUS; SUBCUTANEOUS at 08:38

## 2018-08-21 RX ADMIN — INSULIN LISPRO 2 UNITS: 100 INJECTION, SOLUTION INTRAVENOUS; SUBCUTANEOUS at 13:47

## 2018-08-21 RX ADMIN — METFORMIN HYDROCHLORIDE 500 MG: 500 TABLET ORAL at 16:20

## 2018-08-21 RX ADMIN — HYDRALAZINE HYDROCHLORIDE 25 MG: 25 TABLET, FILM COATED ORAL at 16:20

## 2018-08-21 RX ADMIN — INSULIN LISPRO 1 UNITS: 100 INJECTION, SOLUTION INTRAVENOUS; SUBCUTANEOUS at 18:30

## 2018-08-21 RX ADMIN — SODIUM CHLORIDE, PRESERVATIVE FREE 10 ML: 5 INJECTION INTRAVENOUS at 19:56

## 2018-08-21 RX ADMIN — FAMOTIDINE 20 MG: 10 INJECTION, SOLUTION INTRAVENOUS at 08:36

## 2018-08-21 RX ADMIN — LEVOTHYROXINE SODIUM 125 MCG: 125 TABLET ORAL at 06:21

## 2018-08-21 RX ADMIN — METOPROLOL TARTRATE 50 MG: 50 TABLET ORAL at 19:56

## 2018-08-21 RX ADMIN — FAMOTIDINE 20 MG: 10 INJECTION, SOLUTION INTRAVENOUS at 19:57

## 2018-08-21 RX ADMIN — LOSARTAN POTASSIUM 100 MG: 100 TABLET, FILM COATED ORAL at 08:36

## 2018-08-21 RX ADMIN — MORPHINE SULFATE 2 MG: 2 INJECTION, SOLUTION INTRAMUSCULAR; INTRAVENOUS at 12:32

## 2018-08-21 ASSESSMENT — PAIN SCALES - GENERAL
PAINLEVEL_OUTOF10: 7
PAINLEVEL_OUTOF10: 0

## 2018-08-21 NOTE — PROGRESS NOTES
[facility]    PATIENT NAME: Gonzalo Obregon     TODAY'S DATE: 8/21/2018    CHIEF COMPLAINT: upper abd pain    INTERVAL HISTORY/HPI:    Pt reports she was feeling much better, but after eating breakfast, did have some increased abd discomfort. REVIEW OF SYSTEMS:  Pertinent positives and negatives as per interval history section    OBJECTIVE:  VITALS:  BP (!) 162/80   Pulse 62   Temp 97.6 °F (36.4 °C) (Oral)   Resp 14   Ht 5' 2\" (1.575 m)   Wt 211 lb (95.7 kg)   LMP 12/16/2005   SpO2 97%   BMI 38.59 kg/m²     INTAKE/OUTPUT:    I/O last 3 completed shifts: In: 1101.7 [P.O.:480; I.V.:621.7]  Out: 650 [Urine:650]  No intake/output data recorded. CONSTITUTIONAL:  awake and alert  LUNGS:  Respirations easy and unlabored, no crackles or wheezing  CARD:  regular rate and rhythm  ABDOMEN:  normal bowel sounds, soft, non-distended, mild RU  Q tenderness     Data:  CBC:   Recent Labs      08/19/18   1030  08/20/18   0623  08/21/18   0632   WBC  14.1*  7.5  6.5   HGB  14.0  11.8*  12.5   HCT  40.0  33.6*  35.9*   PLT  305  285  285     BMP:    Recent Labs      08/19/18   1030  08/20/18   0623  08/21/18   0632   NA  135*  141  143   K  3.9  3.1*  3.5   CL  96*  104  106   CO2  22  24  25   BUN  17  13  11   CREATININE  0.6  <0.5*  0.6   GLUCOSE  326*  149*  154*     Hepatic:   Recent Labs      08/19/18   1030  08/20/18   0623  08/21/18   0632   AST  523*  886*  371*   ALT  285*  759*  515*   BILITOT  1.5*  2.5*  1.0   ALKPHOS  115  132*  141*     Mag:      Recent Labs      08/20/18   0623   MG  1.70*      Phos:     Recent Labs      08/21/18   0632   PHOS  2.5      INR:   Recent Labs      08/20/18   0933   INR  1.10         ASSESSMENT AND PLAN:  Cholelithiasis with elevated LFTs - possibly pt passed stone.    Diet advanced by GI - discussed with pt that would like to see how she does with lunch prior to d/c given her increase in sx after breakfast.     Dipika Perera   [id number]    Surgery Staff    I have examined this patient and read and agree with the note by Sonny Aguilar CNP from today. Pt tried lunch today and noted increased discomfort. Will keep admitted today and back down to clear liquids. Re-evaluate in AM - if still w/ symptoms, may need lap cholecystectomy with this admission (possibly tomorrow).         [facility] M[facility]

## 2018-08-21 NOTE — PROGRESS NOTES
125 mcg, Oral, Daily  morphine injection 2 mg, 2 mg, Intravenous, Q4H PRN  Allergies:  Amlodipine besylate; Naproxen sodium; Nsaids; Penicillins; Streptomycin; and Sulfa antibiotics    Social History:    Devoid of active TOB or ETOH use  Family History:   [place]  REVIEW OF SYSTEMS:    Positive for that which was noted in the HPI. All other systems reviewed and negative.      PHYSICAL EXAM:    Vitals:    BP (!) 162/90   Pulse 51   Temp 98.4 °F (36.9 °C) (Oral)   Resp 14   Ht 5' 2\" (1.575 m)   Wt 211 lb (95.7 kg)   LMP 12/16/2005   SpO2 99%   BMI 38.59 kg/m²     GEN: awake, alert, conversant   HEENT: PERRLA, clear and moist oropharynx  Neck: supple, no masses, trachea midline, no JVD  Lungs: CTA-B w/o wheezes, rhonchi or rales, good A/E B  Cardiac:  RRR w/o murmurs, rubs or gallops  Abdomen: soft, minimal tenderness in epigastrium, non distended w/o HSM, masses, ascites or bruits, NABs  EXT: no clubbing, cyanosis, edema or asterixis  Skin: no rashes, telangiectasias, lesions  Neuro: grossly non-focal    DATA:    CBC with Differential:    Lab Results   Component Value Date    WBC 6.5 08/21/2018    RBC 4.00 08/21/2018    HGB 12.5 08/21/2018    HCT 35.9 08/21/2018     08/21/2018    MCV 89.8 08/21/2018    MCH 31.2 08/21/2018    MCHC 34.8 08/21/2018    RDW 13.6 08/21/2018    SEGSPCT 67.1 05/29/2013    LYMPHOPCT 31.8 08/20/2018    MONOPCT 5.2 08/20/2018    EOSPCT 0.2 12/18/2010    BASOPCT 1.0 08/20/2018    MONOSABS 0.4 08/20/2018    LYMPHSABS 2.4 08/20/2018    EOSABS 0.0 08/20/2018    BASOSABS 0.1 08/20/2018    DIFFTYPE Auto 05/29/2013     CMP:    Lab Results   Component Value Date     08/21/2018    K 3.5 08/21/2018    K 3.1 08/20/2018     08/21/2018    CO2 25 08/21/2018    BUN 11 08/21/2018    CREATININE 0.6 08/21/2018    GFRAA >60 08/21/2018    GFRAA >60 05/29/2013    AGRATIO 1.0 08/20/2018    LABGLOM >60 08/21/2018    GLUCOSE 154 08/21/2018    PROT 6.7 08/21/2018    PROT 7.8 11/28/2012    LABALBU 3.5 08/21/2018    CALCIUM 8.6 08/21/2018    BILITOT 1.0 08/21/2018    ALKPHOS 141 08/21/2018     08/21/2018     08/21/2018     PT/INR:    Lab Results   Component Value Date    PROTIME 12.5 08/20/2018    INR 1.10 08/20/2018       MRCP:   1. Cholelithiasis.  No evidence of acute inflammation to suggest acute   cholecystitis. 2. Normal MRCP.  No current evidence of biliary ductal dilatation. 3. Hepatic steatosis and left adrenal adenoma incidentally observed. Acute hepatitis panel negative    IMPRESSION/RECOMMENDATIONS:      65yo F with medical history significant for DM, HTN and thyroid disease who was admitted with acute onset abdominal pain, N/V in the setting of a hepatocellular pattern of LFT changes. Hepatitis panel negative. Liver function studies improved. Despite the MRCP clinical presentation highly suggestive of passing a stone. Would recommend elective cholecystectomy  Await additional serologies  1.  Okay for discharge

## 2018-08-22 LAB
A/G RATIO: 1 (ref 1.1–2.2)
ALBUMIN SERPL-MCNC: 3.2 G/DL (ref 3.4–5)
ALBUMIN SERPL-MCNC: 3.3 G/DL (ref 3.1–4.9)
ALP BLD-CCNC: 149 U/L (ref 40–129)
ALPHA-1-GLOBULIN: 0.3 G/DL (ref 0.2–0.4)
ALPHA-2-GLOBULIN: 0.7 G/DL (ref 0.4–1.1)
ALT SERPL-CCNC: 388 U/L (ref 10–40)
ANION GAP SERPL CALCULATED.3IONS-SCNC: 12 MMOL/L (ref 3–16)
AST SERPL-CCNC: 232 U/L (ref 15–37)
BASOPHILS ABSOLUTE: 0.1 K/UL (ref 0–0.2)
BASOPHILS RELATIVE PERCENT: 1.3 %
BETA GLOBULIN: 1.4 G/DL (ref 0.9–1.6)
BILIRUB SERPL-MCNC: 1 MG/DL (ref 0–1)
BILIRUBIN DIRECT: 0.5 MG/DL (ref 0–0.3)
BILIRUBIN, INDIRECT: 0.5 MG/DL (ref 0–1)
BUN BLDV-MCNC: 9 MG/DL (ref 7–20)
CALCIUM SERPL-MCNC: 8.6 MG/DL (ref 8.3–10.6)
CHLORIDE BLD-SCNC: 102 MMOL/L (ref 99–110)
CO2: 26 MMOL/L (ref 21–32)
CREAT SERPL-MCNC: 0.6 MG/DL (ref 0.6–1.2)
EOSINOPHILS ABSOLUTE: 0.2 K/UL (ref 0–0.6)
EOSINOPHILS RELATIVE PERCENT: 3.2 %
GAMMA GLOBULIN: 1.3 G/DL (ref 0.6–1.8)
GFR AFRICAN AMERICAN: >60
GFR NON-AFRICAN AMERICAN: >60
GLOBULIN: 3.1 G/DL
GLUCOSE BLD-MCNC: 115 MG/DL (ref 70–99)
GLUCOSE BLD-MCNC: 125 MG/DL (ref 70–99)
GLUCOSE BLD-MCNC: 140 MG/DL (ref 70–99)
GLUCOSE BLD-MCNC: 140 MG/DL (ref 70–99)
GLUCOSE BLD-MCNC: 176 MG/DL (ref 70–99)
HCT VFR BLD CALC: 35.4 % (ref 36–48)
HEMOGLOBIN: 12.4 G/DL (ref 12–16)
LYMPHOCYTES ABSOLUTE: 2.4 K/UL (ref 1–5.1)
LYMPHOCYTES RELATIVE PERCENT: 34.5 %
MCH RBC QN AUTO: 31.6 PG (ref 26–34)
MCHC RBC AUTO-ENTMCNC: 35 G/DL (ref 31–36)
MCV RBC AUTO: 90.1 FL (ref 80–100)
MONOCYTES ABSOLUTE: 0.4 K/UL (ref 0–1.3)
MONOCYTES RELATIVE PERCENT: 6.1 %
NEUTROPHILS ABSOLUTE: 3.8 K/UL (ref 1.7–7.7)
NEUTROPHILS RELATIVE PERCENT: 54.9 %
PDW BLD-RTO: 13.7 % (ref 12.4–15.4)
PERFORMED ON: ABNORMAL
PLATELET # BLD: 293 K/UL (ref 135–450)
PMV BLD AUTO: 8.1 FL (ref 5–10.5)
POTASSIUM SERPL-SCNC: 3.2 MMOL/L (ref 3.5–5.1)
RBC # BLD: 3.93 M/UL (ref 4–5.2)
SODIUM BLD-SCNC: 140 MMOL/L (ref 136–145)
SPE/IFE INTERPRETATION: NORMAL
TOTAL PROTEIN: 6.3 G/DL (ref 6.4–8.2)
TOTAL PROTEIN: 6.8 G/DL (ref 6.4–8.2)
WBC # BLD: 6.9 K/UL (ref 4–11)

## 2018-08-22 PROCEDURE — 2500000003 HC RX 250 WO HCPCS: Performed by: INTERNAL MEDICINE

## 2018-08-22 PROCEDURE — 36415 COLL VENOUS BLD VENIPUNCTURE: CPT

## 2018-08-22 PROCEDURE — 2580000003 HC RX 258: Performed by: INTERNAL MEDICINE

## 2018-08-22 PROCEDURE — APPSS30 APP SPLIT SHARED TIME 16-30 MINUTES: Performed by: CLINICAL NURSE SPECIALIST

## 2018-08-22 PROCEDURE — 1200000000 HC SEMI PRIVATE

## 2018-08-22 PROCEDURE — 82248 BILIRUBIN DIRECT: CPT

## 2018-08-22 PROCEDURE — 94761 N-INVAS EAR/PLS OXIMETRY MLT: CPT

## 2018-08-22 PROCEDURE — 85025 COMPLETE CBC W/AUTO DIFF WBC: CPT

## 2018-08-22 PROCEDURE — 80053 COMPREHEN METABOLIC PANEL: CPT

## 2018-08-22 PROCEDURE — 2700000000 HC OXYGEN THERAPY PER DAY

## 2018-08-22 PROCEDURE — S0028 INJECTION, FAMOTIDINE, 20 MG: HCPCS | Performed by: INTERNAL MEDICINE

## 2018-08-22 PROCEDURE — 6370000000 HC RX 637 (ALT 250 FOR IP): Performed by: INTERNAL MEDICINE

## 2018-08-22 PROCEDURE — 6370000000 HC RX 637 (ALT 250 FOR IP): Performed by: NURSE PRACTITIONER

## 2018-08-22 PROCEDURE — 99231 SBSQ HOSP IP/OBS SF/LOW 25: CPT | Performed by: SURGERY

## 2018-08-22 RX ORDER — POTASSIUM CHLORIDE 20 MEQ/1
20 TABLET, EXTENDED RELEASE ORAL 2 TIMES DAILY WITH MEALS
Status: DISCONTINUED | OUTPATIENT
Start: 2018-08-22 | End: 2018-08-25 | Stop reason: HOSPADM

## 2018-08-22 RX ORDER — HYDROCODONE BITARTRATE AND ACETAMINOPHEN 5; 325 MG/1; MG/1
1 TABLET ORAL EVERY 6 HOURS PRN
Status: DISCONTINUED | OUTPATIENT
Start: 2018-08-22 | End: 2018-08-22

## 2018-08-22 RX ADMIN — SODIUM CHLORIDE, PRESERVATIVE FREE 10 ML: 5 INJECTION INTRAVENOUS at 20:38

## 2018-08-22 RX ADMIN — METFORMIN HYDROCHLORIDE 500 MG: 500 TABLET ORAL at 18:13

## 2018-08-22 RX ADMIN — LEVOTHYROXINE SODIUM 125 MCG: 125 TABLET ORAL at 06:10

## 2018-08-22 RX ADMIN — FAMOTIDINE 20 MG: 10 INJECTION, SOLUTION INTRAVENOUS at 20:39

## 2018-08-22 RX ADMIN — HYDRALAZINE HYDROCHLORIDE 25 MG: 25 TABLET, FILM COATED ORAL at 20:47

## 2018-08-22 RX ADMIN — SODIUM CHLORIDE, PRESERVATIVE FREE 10 ML: 5 INJECTION INTRAVENOUS at 09:59

## 2018-08-22 RX ADMIN — SODIUM CHLORIDE: 9 INJECTION, SOLUTION INTRAVENOUS at 00:49

## 2018-08-22 RX ADMIN — POTASSIUM CHLORIDE 20 MEQ: 20 TABLET, EXTENDED RELEASE ORAL at 18:13

## 2018-08-22 RX ADMIN — SODIUM CHLORIDE: 9 INJECTION, SOLUTION INTRAVENOUS at 22:58

## 2018-08-22 RX ADMIN — INSULIN LISPRO 1 UNITS: 100 INJECTION, SOLUTION INTRAVENOUS; SUBCUTANEOUS at 20:41

## 2018-08-22 RX ADMIN — FAMOTIDINE 20 MG: 10 INJECTION, SOLUTION INTRAVENOUS at 09:58

## 2018-08-22 RX ADMIN — METOPROLOL TARTRATE 50 MG: 50 TABLET ORAL at 20:39

## 2018-08-22 RX ADMIN — HYDRALAZINE HYDROCHLORIDE 25 MG: 25 TABLET, FILM COATED ORAL at 06:10

## 2018-08-22 RX ADMIN — METOPROLOL TARTRATE 50 MG: 50 TABLET ORAL at 09:58

## 2018-08-22 ASSESSMENT — PAIN SCALES - GENERAL: PAINLEVEL_OUTOF10: 0

## 2018-08-22 NOTE — PROGRESS NOTES
LYMPHSABS 2.4 08/22/2018    EOSABS 0.2 08/22/2018    BASOSABS 0.1 08/22/2018    DIFFTYPE Auto 05/29/2013     CMP:    Lab Results   Component Value Date     08/22/2018    K 3.2 08/22/2018    K 3.1 08/20/2018     08/22/2018    CO2 26 08/22/2018    BUN 9 08/22/2018    CREATININE 0.6 08/22/2018    GFRAA >60 08/22/2018    GFRAA >60 05/29/2013    AGRATIO 1.0 08/22/2018    LABGLOM >60 08/22/2018    GLUCOSE 140 08/22/2018    PROT 6.3 08/22/2018    PROT 7.8 11/28/2012    LABALBU 3.2 08/22/2018    CALCIUM 8.6 08/22/2018    BILITOT 1.0 08/22/2018    ALKPHOS 149 08/22/2018     08/22/2018     08/22/2018     PT/INR:    Lab Results   Component Value Date    PROTIME 12.5 08/20/2018    INR 1.10 08/20/2018       MRCP:   1. Cholelithiasis.  No evidence of acute inflammation to suggest acute   cholecystitis. 2. Normal MRCP.  No current evidence of biliary ductal dilatation. 3. Hepatic steatosis and left adrenal adenoma incidentally observed. Acute hepatitis panel negative    IMPRESSION/RECOMMENDATIONS:      63yo F with medical history significant for DM, HTN and thyroid disease who was admitted with acute onset abdominal pain, N/V in the setting of a hepatocellular pattern of LFT changes. Hepatitis panel negative. Liver function studies improved. Despite the MRCP clinical presentation highly suggestive of passing a stone.   Recommend cholecystectomy prior to discharge  Additional serologies negative

## 2018-08-22 NOTE — PLAN OF CARE
Problem: Falls - Risk of:  Goal: Will remain free from falls  Will remain free from falls   Outcome: Ongoing  Pt ambulating around room independently with steady gait. Bed in lowest, locked position, side rails up X2, call light within reach. Will continue to monitor.

## 2018-08-22 NOTE — PROGRESS NOTES
hydronephrosis. Hepatic steatosis. MRI ABDOMEN WO CONTRAST MRCP   Final Result   1. Cholelithiasis. No evidence of acute inflammation to suggest acute   cholecystitis. 2. Normal MRCP. No current evidence of biliary ductal dilatation. 3. Hepatic steatosis and left adrenal adenoma incidentally observed. Assessment/Plan:    Active Hospital Problems    Diagnosis Date Noted    Abdominal pain [R10.9] 08/19/2018     Priority: High    Common bile duct stone [K80.50]     Calculus of gallbladder [K80.20] 08/19/2018    Abnormal LFTs [R94.5] 08/19/2018    Obesity [E66.9] 03/06/2016    Hypothyroid [E03.9] 03/06/2016    DM [facility]di[facility] [E11.9] 03/06/2016     Acute of no pain, unknown etiology, possible due to choledocholithiasis and cholelithiasis, admit to MedSurg, keep nothing by mouth, symptomatic management with when necessary morphine, GI and general surgery consulted. The patient clinically improved initially, after advancement of diet she noted more pain, Further plan for cholecystectomy as per surgery. Possible plan for cholecystectomy during this admission as per surgery.     Abnormal LFT, alkaline phosphatase normal with elevated ALT and AST, ultrasound showed an enlarged common bile duct but no stone noted, check MRCP, follow LFT-worsening, acute hepatitis panel ordered, GI consulted, will follow. Acute hepatitis panel were normal, MRCP noted showing cholelithiasis but no choledocholithiasis, input from Gen. surgery noted. LFTs are improving, accurate hepatitis panel were unremarkable, GI input noted, recommended advance diet.   Patient could not tolerate advancement of diet, further plan as per general surgery.     Cholelithiasis without evidence of infection, see plan as above, general surgery consulted, will monitor.     Diabetes mellitus type 2, uncontrolled, started on insulin sliding scale, monitor blood sugar.     Morbid Obesity complicating assessment

## 2018-08-22 NOTE — PLAN OF CARE
Problem: Nausea/Vomiting:  Goal: Absence of nausea/vomiting  Absence of nausea/vomiting   As long as patient is NPO, she does not have nausea/vomiting.

## 2018-08-22 NOTE — CARE COORDINATION
Case Management/Follow up:    Chart reviewed for length of stay. Hospital day # 3  Unit:  C5    Diagnosis and current status as per MD progress: Abdominal pain 2/2 cholelithiasis - Followed by Internal Medicine, GI, General surgery. Per MD 8/21 symptomatic management and advancement of diet with anticipated UP cholecystectomy. Anticipated d/c date: 8/24 with diet toleration     Expected plan for discharge: Home     Potential barriers: None     No needs identified by DCP. Please contact should needs arise.      Henrique Piedra RN

## 2018-08-23 ENCOUNTER — ANESTHESIA (OUTPATIENT)
Dept: OPERATING ROOM | Age: 63
DRG: 418 | End: 2018-08-23
Payer: COMMERCIAL

## 2018-08-23 ENCOUNTER — APPOINTMENT (OUTPATIENT)
Dept: GENERAL RADIOLOGY | Age: 63
DRG: 418 | End: 2018-08-23
Payer: COMMERCIAL

## 2018-08-23 ENCOUNTER — ANESTHESIA EVENT (OUTPATIENT)
Dept: OPERATING ROOM | Age: 63
DRG: 418 | End: 2018-08-23
Payer: COMMERCIAL

## 2018-08-23 VITALS
OXYGEN SATURATION: 100 % | RESPIRATION RATE: 32 BRPM | DIASTOLIC BLOOD PRESSURE: 58 MMHG | SYSTOLIC BLOOD PRESSURE: 110 MMHG

## 2018-08-23 LAB
ALBUMIN SERPL-MCNC: 3.2 G/DL (ref 3.4–5)
ALP BLD-CCNC: 132 U/L (ref 40–129)
ALT SERPL-CCNC: 285 U/L (ref 10–40)
AST SERPL-CCNC: 131 U/L (ref 15–37)
BILIRUB SERPL-MCNC: 1 MG/DL (ref 0–1)
BILIRUBIN DIRECT: 0.4 MG/DL (ref 0–0.3)
BILIRUBIN, INDIRECT: 0.6 MG/DL (ref 0–1)
GLUCOSE BLD-MCNC: 144 MG/DL (ref 70–99)
GLUCOSE BLD-MCNC: 153 MG/DL (ref 70–99)
GLUCOSE BLD-MCNC: 98 MG/DL (ref 70–99)
PERFORMED ON: ABNORMAL
PERFORMED ON: ABNORMAL
PERFORMED ON: NORMAL
TOTAL PROTEIN: 6.3 G/DL (ref 6.4–8.2)

## 2018-08-23 PROCEDURE — 7100000000 HC PACU RECOVERY - FIRST 15 MIN: Performed by: SURGERY

## 2018-08-23 PROCEDURE — 3600000012 HC SURGERY LEVEL 2 ADDTL 15MIN: Performed by: SURGERY

## 2018-08-23 PROCEDURE — 6360000004 HC RX CONTRAST MEDICATION: Performed by: SURGERY

## 2018-08-23 PROCEDURE — 7100000001 HC PACU RECOVERY - ADDTL 15 MIN: Performed by: SURGERY

## 2018-08-23 PROCEDURE — BF131ZZ FLUOROSCOPY OF GALLBLADDER AND BILE DUCTS USING LOW OSMOLAR CONTRAST: ICD-10-PCS | Performed by: SURGERY

## 2018-08-23 PROCEDURE — 2700000000 HC OXYGEN THERAPY PER DAY

## 2018-08-23 PROCEDURE — 2580000003 HC RX 258: Performed by: INTERNAL MEDICINE

## 2018-08-23 PROCEDURE — 6370000000 HC RX 637 (ALT 250 FOR IP): Performed by: INTERNAL MEDICINE

## 2018-08-23 PROCEDURE — 0FT44ZZ RESECTION OF GALLBLADDER, PERCUTANEOUS ENDOSCOPIC APPROACH: ICD-10-PCS | Performed by: SURGERY

## 2018-08-23 PROCEDURE — C1773 RET DEV, INSERTABLE: HCPCS | Performed by: SURGERY

## 2018-08-23 PROCEDURE — 2500000003 HC RX 250 WO HCPCS: Performed by: NURSE ANESTHETIST, CERTIFIED REGISTERED

## 2018-08-23 PROCEDURE — 3700000000 HC ANESTHESIA ATTENDED CARE: Performed by: SURGERY

## 2018-08-23 PROCEDURE — 2709999900 HC NON-CHARGEABLE SUPPLY: Performed by: SURGERY

## 2018-08-23 PROCEDURE — S0028 INJECTION, FAMOTIDINE, 20 MG: HCPCS | Performed by: INTERNAL MEDICINE

## 2018-08-23 PROCEDURE — 3700000001 HC ADD 15 MINUTES (ANESTHESIA): Performed by: SURGERY

## 2018-08-23 PROCEDURE — 2580000003 HC RX 258: Performed by: NURSE ANESTHETIST, CERTIFIED REGISTERED

## 2018-08-23 PROCEDURE — 6360000002 HC RX W HCPCS: Performed by: NURSE ANESTHETIST, CERTIFIED REGISTERED

## 2018-08-23 PROCEDURE — 88304 TISSUE EXAM BY PATHOLOGIST: CPT

## 2018-08-23 PROCEDURE — 6360000002 HC RX W HCPCS: Performed by: ANESTHESIOLOGY

## 2018-08-23 PROCEDURE — 2500000003 HC RX 250 WO HCPCS: Performed by: INTERNAL MEDICINE

## 2018-08-23 PROCEDURE — 3600000002 HC SURGERY LEVEL 2 BASE: Performed by: SURGERY

## 2018-08-23 PROCEDURE — 3209999900 FLUORO FOR SURGICAL PROCEDURES

## 2018-08-23 PROCEDURE — 47563 LAPARO CHOLECYSTECTOMY/GRAPH: CPT | Performed by: SURGERY

## 2018-08-23 PROCEDURE — 2580000003 HC RX 258: Performed by: SURGERY

## 2018-08-23 PROCEDURE — 6360000002 HC RX W HCPCS: Performed by: INTERNAL MEDICINE

## 2018-08-23 PROCEDURE — 1200000000 HC SEMI PRIVATE

## 2018-08-23 PROCEDURE — 74300 X-RAY BILE DUCTS/PANCREAS: CPT

## 2018-08-23 PROCEDURE — 80076 HEPATIC FUNCTION PANEL: CPT

## 2018-08-23 PROCEDURE — 94761 N-INVAS EAR/PLS OXIMETRY MLT: CPT

## 2018-08-23 PROCEDURE — 2780000010 HC IMPLANT OTHER: Performed by: SURGERY

## 2018-08-23 PROCEDURE — 36415 COLL VENOUS BLD VENIPUNCTURE: CPT

## 2018-08-23 PROCEDURE — 6370000000 HC RX 637 (ALT 250 FOR IP): Performed by: NURSE PRACTITIONER

## 2018-08-23 PROCEDURE — 2500000003 HC RX 250 WO HCPCS: Performed by: ANESTHESIOLOGY

## 2018-08-23 RX ORDER — LIDOCAINE HYDROCHLORIDE 20 MG/ML
INJECTION, SOLUTION INFILTRATION; PERINEURAL PRN
Status: DISCONTINUED | OUTPATIENT
Start: 2018-08-23 | End: 2018-08-23 | Stop reason: SDUPTHER

## 2018-08-23 RX ORDER — OXYCODONE HYDROCHLORIDE AND ACETAMINOPHEN 5; 325 MG/1; MG/1
2 TABLET ORAL PRN
Status: DISCONTINUED | OUTPATIENT
Start: 2018-08-23 | End: 2018-08-23 | Stop reason: HOSPADM

## 2018-08-23 RX ORDER — MORPHINE SULFATE 4 MG/ML
4 INJECTION, SOLUTION INTRAMUSCULAR; INTRAVENOUS
Status: DISCONTINUED | OUTPATIENT
Start: 2018-08-23 | End: 2018-08-25 | Stop reason: HOSPADM

## 2018-08-23 RX ORDER — HYDRALAZINE HYDROCHLORIDE 20 MG/ML
5 INJECTION INTRAMUSCULAR; INTRAVENOUS EVERY 30 MIN PRN
Status: DISCONTINUED | OUTPATIENT
Start: 2018-08-23 | End: 2018-08-23 | Stop reason: HOSPADM

## 2018-08-23 RX ORDER — HYDRALAZINE HYDROCHLORIDE 25 MG/1
50 TABLET, FILM COATED ORAL EVERY 8 HOURS SCHEDULED
Status: DISCONTINUED | OUTPATIENT
Start: 2018-08-23 | End: 2018-08-25 | Stop reason: HOSPADM

## 2018-08-23 RX ORDER — ONDANSETRON 2 MG/ML
4 INJECTION INTRAMUSCULAR; INTRAVENOUS EVERY 30 MIN PRN
Status: DISCONTINUED | OUTPATIENT
Start: 2018-08-23 | End: 2018-08-23 | Stop reason: HOSPADM

## 2018-08-23 RX ORDER — BUPIVACAINE HYDROCHLORIDE 5 MG/ML
INJECTION, SOLUTION PERINEURAL PRN
Status: DISCONTINUED | OUTPATIENT
Start: 2018-08-23 | End: 2018-08-23 | Stop reason: HOSPADM

## 2018-08-23 RX ORDER — DIPHENHYDRAMINE HYDROCHLORIDE 50 MG/ML
6.25 INJECTION INTRAMUSCULAR; INTRAVENOUS
Status: DISCONTINUED | OUTPATIENT
Start: 2018-08-23 | End: 2018-08-23 | Stop reason: HOSPADM

## 2018-08-23 RX ORDER — PROPOFOL 10 MG/ML
INJECTION, EMULSION INTRAVENOUS PRN
Status: DISCONTINUED | OUTPATIENT
Start: 2018-08-23 | End: 2018-08-23 | Stop reason: SDUPTHER

## 2018-08-23 RX ORDER — SUCCINYLCHOLINE CHLORIDE 20 MG/ML
INJECTION INTRAMUSCULAR; INTRAVENOUS PRN
Status: DISCONTINUED | OUTPATIENT
Start: 2018-08-23 | End: 2018-08-23 | Stop reason: SDUPTHER

## 2018-08-23 RX ORDER — FENTANYL CITRATE 50 UG/ML
INJECTION, SOLUTION INTRAMUSCULAR; INTRAVENOUS PRN
Status: DISCONTINUED | OUTPATIENT
Start: 2018-08-23 | End: 2018-08-23 | Stop reason: SDUPTHER

## 2018-08-23 RX ORDER — MORPHINE SULFATE 2 MG/ML
2 INJECTION, SOLUTION INTRAMUSCULAR; INTRAVENOUS
Status: DISCONTINUED | OUTPATIENT
Start: 2018-08-23 | End: 2018-08-25 | Stop reason: HOSPADM

## 2018-08-23 RX ORDER — SODIUM CHLORIDE, SODIUM LACTATE, POTASSIUM CHLORIDE, AND CALCIUM CHLORIDE .6; .31; .03; .02 G/100ML; G/100ML; G/100ML; G/100ML
IRRIGANT IRRIGATION PRN
Status: DISCONTINUED | OUTPATIENT
Start: 2018-08-23 | End: 2018-08-23 | Stop reason: HOSPADM

## 2018-08-23 RX ORDER — SODIUM CHLORIDE, SODIUM LACTATE, POTASSIUM CHLORIDE, CALCIUM CHLORIDE 600; 310; 30; 20 MG/100ML; MG/100ML; MG/100ML; MG/100ML
INJECTION, SOLUTION INTRAVENOUS CONTINUOUS PRN
Status: DISCONTINUED | OUTPATIENT
Start: 2018-08-23 | End: 2018-08-23 | Stop reason: SDUPTHER

## 2018-08-23 RX ORDER — ONDANSETRON 2 MG/ML
INJECTION INTRAMUSCULAR; INTRAVENOUS PRN
Status: DISCONTINUED | OUTPATIENT
Start: 2018-08-23 | End: 2018-08-23 | Stop reason: SDUPTHER

## 2018-08-23 RX ORDER — VECURONIUM BROMIDE 1 MG/ML
INJECTION, POWDER, LYOPHILIZED, FOR SOLUTION INTRAVENOUS PRN
Status: DISCONTINUED | OUTPATIENT
Start: 2018-08-23 | End: 2018-08-23 | Stop reason: SDUPTHER

## 2018-08-23 RX ORDER — MIDAZOLAM HYDROCHLORIDE 1 MG/ML
INJECTION INTRAMUSCULAR; INTRAVENOUS PRN
Status: DISCONTINUED | OUTPATIENT
Start: 2018-08-23 | End: 2018-08-23 | Stop reason: SDUPTHER

## 2018-08-23 RX ORDER — MEPERIDINE HYDROCHLORIDE 50 MG/ML
12.5 INJECTION INTRAMUSCULAR; INTRAVENOUS; SUBCUTANEOUS EVERY 5 MIN PRN
Status: DISCONTINUED | OUTPATIENT
Start: 2018-08-23 | End: 2018-08-23 | Stop reason: HOSPADM

## 2018-08-23 RX ORDER — HYDRALAZINE HYDROCHLORIDE 20 MG/ML
10 INJECTION INTRAMUSCULAR; INTRAVENOUS EVERY 6 HOURS PRN
Status: DISCONTINUED | OUTPATIENT
Start: 2018-08-23 | End: 2018-08-25 | Stop reason: HOSPADM

## 2018-08-23 RX ORDER — OXYCODONE HYDROCHLORIDE AND ACETAMINOPHEN 5; 325 MG/1; MG/1
1 TABLET ORAL PRN
Status: DISCONTINUED | OUTPATIENT
Start: 2018-08-23 | End: 2018-08-23 | Stop reason: HOSPADM

## 2018-08-23 RX ORDER — LABETALOL HYDROCHLORIDE 5 MG/ML
5 INJECTION, SOLUTION INTRAVENOUS
Status: DISCONTINUED | OUTPATIENT
Start: 2018-08-23 | End: 2018-08-23 | Stop reason: HOSPADM

## 2018-08-23 RX ORDER — DEXAMETHASONE SODIUM PHOSPHATE 4 MG/ML
INJECTION, SOLUTION INTRA-ARTICULAR; INTRALESIONAL; INTRAMUSCULAR; INTRAVENOUS; SOFT TISSUE PRN
Status: DISCONTINUED | OUTPATIENT
Start: 2018-08-23 | End: 2018-08-23 | Stop reason: SDUPTHER

## 2018-08-23 RX ADMIN — SODIUM CHLORIDE, POTASSIUM CHLORIDE, SODIUM LACTATE AND CALCIUM CHLORIDE: 600; 310; 30; 20 INJECTION, SOLUTION INTRAVENOUS at 17:36

## 2018-08-23 RX ADMIN — HYDRALAZINE HYDROCHLORIDE 50 MG: 25 TABLET, FILM COATED ORAL at 23:30

## 2018-08-23 RX ADMIN — PHENYLEPHRINE HYDROCHLORIDE 200 MCG: 10 INJECTION INTRAVENOUS at 17:52

## 2018-08-23 RX ADMIN — FAMOTIDINE 20 MG: 10 INJECTION, SOLUTION INTRAVENOUS at 09:24

## 2018-08-23 RX ADMIN — SODIUM CHLORIDE, PRESERVATIVE FREE 10 ML: 5 INJECTION INTRAVENOUS at 09:23

## 2018-08-23 RX ADMIN — HYDROMORPHONE HYDROCHLORIDE 0.5 MG: 1 INJECTION, SOLUTION INTRAMUSCULAR; INTRAVENOUS; SUBCUTANEOUS at 19:35

## 2018-08-23 RX ADMIN — FENTANYL CITRATE 150 MCG: 50 INJECTION INTRAMUSCULAR; INTRAVENOUS at 17:44

## 2018-08-23 RX ADMIN — FAMOTIDINE 20 MG: 10 INJECTION, SOLUTION INTRAVENOUS at 23:31

## 2018-08-23 RX ADMIN — ONDANSETRON 4 MG: 2 SOLUTION INTRAMUSCULAR; INTRAVENOUS at 22:10

## 2018-08-23 RX ADMIN — PROPOFOL 200 MG: 10 INJECTION, EMULSION INTRAVENOUS at 17:44

## 2018-08-23 RX ADMIN — FENTANYL CITRATE 100 MCG: 50 INJECTION INTRAMUSCULAR; INTRAVENOUS at 18:03

## 2018-08-23 RX ADMIN — MIDAZOLAM HYDROCHLORIDE 2 MG: 2 INJECTION, SOLUTION INTRAMUSCULAR; INTRAVENOUS at 17:42

## 2018-08-23 RX ADMIN — LOSARTAN POTASSIUM 100 MG: 100 TABLET, FILM COATED ORAL at 09:24

## 2018-08-23 RX ADMIN — LEVOTHYROXINE SODIUM 125 MCG: 125 TABLET ORAL at 06:10

## 2018-08-23 RX ADMIN — HYDRALAZINE HYDROCHLORIDE 10 MG: 20 INJECTION INTRAMUSCULAR; INTRAVENOUS at 12:46

## 2018-08-23 RX ADMIN — VECURONIUM BROMIDE 1 MG: 10 INJECTION, POWDER, LYOPHILIZED, FOR SOLUTION INTRAVENOUS at 17:44

## 2018-08-23 RX ADMIN — HYDROMORPHONE HYDROCHLORIDE 0.5 MG: 1 INJECTION, SOLUTION INTRAMUSCULAR; INTRAVENOUS; SUBCUTANEOUS at 19:24

## 2018-08-23 RX ADMIN — SODIUM CHLORIDE, POTASSIUM CHLORIDE, SODIUM LACTATE AND CALCIUM CHLORIDE: 600; 310; 30; 20 INJECTION, SOLUTION INTRAVENOUS at 18:14

## 2018-08-23 RX ADMIN — LIDOCAINE HYDROCHLORIDE 40 MG: 20 INJECTION, SOLUTION INFILTRATION; PERINEURAL at 17:44

## 2018-08-23 RX ADMIN — ONDANSETRON 4 MG: 2 INJECTION INTRAMUSCULAR; INTRAVENOUS at 17:44

## 2018-08-23 RX ADMIN — METOPROLOL TARTRATE 50 MG: 50 TABLET ORAL at 09:24

## 2018-08-23 RX ADMIN — VECURONIUM BROMIDE 5 MG: 10 INJECTION, POWDER, LYOPHILIZED, FOR SOLUTION INTRAVENOUS at 17:49

## 2018-08-23 RX ADMIN — DEXAMETHASONE SODIUM PHOSPHATE 4 MG: 4 INJECTION, SOLUTION INTRAMUSCULAR; INTRAVENOUS at 17:44

## 2018-08-23 RX ADMIN — INSULIN LISPRO 1 UNITS: 100 INJECTION, SOLUTION INTRAVENOUS; SUBCUTANEOUS at 23:32

## 2018-08-23 RX ADMIN — HYDRALAZINE HYDROCHLORIDE 25 MG: 25 TABLET, FILM COATED ORAL at 06:10

## 2018-08-23 RX ADMIN — POTASSIUM CHLORIDE 20 MEQ: 20 TABLET, EXTENDED RELEASE ORAL at 23:30

## 2018-08-23 RX ADMIN — HYDROMORPHONE HYDROCHLORIDE 0.5 MG: 1 INJECTION, SOLUTION INTRAMUSCULAR; INTRAVENOUS; SUBCUTANEOUS at 19:48

## 2018-08-23 RX ADMIN — SUGAMMADEX 200 MG: 100 INJECTION, SOLUTION INTRAVENOUS at 18:59

## 2018-08-23 RX ADMIN — POTASSIUM CHLORIDE 20 MEQ: 20 TABLET, EXTENDED RELEASE ORAL at 09:24

## 2018-08-23 RX ADMIN — SODIUM CHLORIDE: 9 INJECTION, SOLUTION INTRAVENOUS at 19:42

## 2018-08-23 RX ADMIN — LABETALOL HYDROCHLORIDE 5 MG: 5 INJECTION, SOLUTION INTRAVENOUS at 19:43

## 2018-08-23 RX ADMIN — FENTANYL CITRATE 50 MCG: 50 INJECTION INTRAMUSCULAR; INTRAVENOUS at 18:50

## 2018-08-23 RX ADMIN — SUCCINYLCHOLINE CHLORIDE 100 MG: 20 INJECTION, SOLUTION INTRAMUSCULAR; INTRAVENOUS at 17:44

## 2018-08-23 RX ADMIN — METOPROLOL TARTRATE 50 MG: 50 TABLET ORAL at 23:30

## 2018-08-23 ASSESSMENT — PAIN DESCRIPTION - LOCATION
LOCATION: BACK
LOCATION: ABDOMEN

## 2018-08-23 ASSESSMENT — PULMONARY FUNCTION TESTS
PIF_VALUE: 23
PIF_VALUE: 0
PIF_VALUE: 18
PIF_VALUE: 24
PIF_VALUE: 24
PIF_VALUE: 5
PIF_VALUE: 23
PIF_VALUE: 25
PIF_VALUE: 18
PIF_VALUE: 18
PIF_VALUE: 25
PIF_VALUE: 24
PIF_VALUE: 17
PIF_VALUE: 23
PIF_VALUE: 25
PIF_VALUE: 17
PIF_VALUE: 22
PIF_VALUE: 24
PIF_VALUE: 2
PIF_VALUE: 25
PIF_VALUE: 19
PIF_VALUE: 24
PIF_VALUE: 20
PIF_VALUE: 23
PIF_VALUE: 19
PIF_VALUE: 4
PIF_VALUE: 2
PIF_VALUE: 22
PIF_VALUE: 0
PIF_VALUE: 24
PIF_VALUE: 24
PIF_VALUE: 17
PIF_VALUE: 17
PIF_VALUE: 1
PIF_VALUE: 18
PIF_VALUE: 18
PIF_VALUE: 20
PIF_VALUE: 17
PIF_VALUE: 18
PIF_VALUE: 6
PIF_VALUE: 25
PIF_VALUE: 24
PIF_VALUE: 18
PIF_VALUE: 24
PIF_VALUE: 25
PIF_VALUE: 24
PIF_VALUE: 23
PIF_VALUE: 24
PIF_VALUE: 30
PIF_VALUE: 23
PIF_VALUE: 17
PIF_VALUE: 19
PIF_VALUE: 18
PIF_VALUE: 18
PIF_VALUE: 24
PIF_VALUE: 23
PIF_VALUE: 25
PIF_VALUE: 18
PIF_VALUE: 18
PIF_VALUE: 19
PIF_VALUE: 24
PIF_VALUE: 23
PIF_VALUE: 17
PIF_VALUE: 0
PIF_VALUE: 19
PIF_VALUE: 4
PIF_VALUE: 18
PIF_VALUE: 23
PIF_VALUE: 24
PIF_VALUE: 23
PIF_VALUE: 24
PIF_VALUE: 4
PIF_VALUE: 5
PIF_VALUE: 17
PIF_VALUE: 23
PIF_VALUE: 24
PIF_VALUE: 25
PIF_VALUE: 18
PIF_VALUE: 24
PIF_VALUE: 25
PIF_VALUE: 18
PIF_VALUE: 26
PIF_VALUE: 17

## 2018-08-23 ASSESSMENT — PAIN DESCRIPTION - PAIN TYPE
TYPE: SURGICAL PAIN
TYPE: ACUTE PAIN

## 2018-08-23 ASSESSMENT — PAIN DESCRIPTION - ORIENTATION: ORIENTATION: RIGHT

## 2018-08-23 ASSESSMENT — PAIN DESCRIPTION - FREQUENCY: FREQUENCY: CONTINUOUS

## 2018-08-23 ASSESSMENT — PAIN DESCRIPTION - DESCRIPTORS: DESCRIPTORS: PATIENT UNABLE TO DESCRIBE

## 2018-08-23 ASSESSMENT — PAIN DESCRIPTION - ONSET: ONSET: AWAKENED FROM SLEEP

## 2018-08-23 ASSESSMENT — PAIN SCALES - GENERAL
PAINLEVEL_OUTOF10: 5
PAINLEVEL_OUTOF10: 0
PAINLEVEL_OUTOF10: 5
PAINLEVEL_OUTOF10: 7
PAINLEVEL_OUTOF10: 3

## 2018-08-23 NOTE — ANESTHESIA PRE PROCEDURE
(urinary tract infection) N39.0    Renal calculus or stone N20.0    Pyelonephritis N12    Obesity E66.9    DM [facility]diabetes mellitus) ([facility] E11.9    Hypothyroid E03.9    Abdominal pain R10.9    Calculus of gallbladder K80.20    Abnormal LFTs R94.5    Common bile duct stone K80.50       Past Medical History:        Diagnosis Date    Diabetes mellitus ([facility]     Hypertension     S/P thyroidectomy 2010    Left Throid Lobectomy    Sleep apnea     Thyroid disease     Vertigo        Past Surgical History:        Procedure Laterality Date     SECTION      X2    COLONOSCOPY      CYST REMOVAL  2012    upper back, excision mole lower posterior neck    CYSTOSCOPY  3/6/16    cystoscopy, right ureteral stent placement    PITUITARY SURGERY      benign pituitary tumor       Social History:    Social History   Substance Use Topics    Smoking status: Never Smoker    Smokeless tobacco: Never Used    Alcohol use No                                Counseling given: Not Answered      Vital Signs (Current):   Vitals:    18 1312 18 1425 18 1451 18 1452   BP: (!) 180/88 (!) 196/72 (!) 171/102 (!) 188/105   Pulse: 66 65 64 66   Resp:  16 16    Temp:  98 °F (36.7 °C) 97.6 °F (36.4 °C)    TempSrc:  Oral Oral    SpO2:   99%    Weight:       Height:                                                  BP Readings from Last 3 Encounters:   18 (!) 188/105   16 163/76   16 121/52       NPO Status:                                                                                 BMI:   Wt Readings from Last 3 Encounters:   18 211 lb (95.7 kg)   16 207 lb (93.9 kg)   16 211 lb (95.7 kg)     Body mass index is 38.59 kg/m².     CBC:   Lab Results   Component Value Date    WBC 6.9 2018    RBC 3.93 2018    HGB 12.4 2018    HCT 35.4 2018    MCV 90.1 2018    RDW 13.7 2018     2018       CMP:   Lab Results   Component

## 2018-08-23 NOTE — PROGRESS NOTES
with normal S1/S2 without murmurs, rubs or gallops. Abdomen: Soft, non-tender, non-distended with normal bowel sounds. Musculoskeletal: No clubbing, cyanosis or edema bilaterally. Full range of motion without deformity. Skin: Skin color, texture, turgor normal.  No rashes or lesions. Neurologic:  Neurovascularly intact without any focal sensory/motor deficits. Cranial nerves: II-XII intact, grossly non-focal.  Psychiatric: Alert and oriented, thought content appropriate, normal insight  Capillary Refill: Brisk,< 3 seconds   Peripheral Pulses: +2 palpable, equal bilaterally       Labs:   Recent Labs      08/21/18   0632  08/22/18   0600   WBC  6.5  6.9   HGB  12.5  12.4   HCT  35.9*  35.4*   PLT  285  293     Recent Labs      08/21/18   0632  08/22/18   0600   NA  143  140   K  3.5  3.2*   CL  106  102   CO2  25  26   BUN  11  9   CREATININE  0.6  0.6   CALCIUM  8.6  8.6   PHOS  2.5   --      Recent Labs      08/21/18   0632  08/22/18   0600  08/23/18   0608   AST  371*  232*  131*   ALT  515*  388*  285*   BILIDIR  0.5*  0.5*  0.4*   BILITOT  1.0  1.0  1.0   ALKPHOS  141*  149*  132*       Urinalysis:      Lab Results   Component Value Date    NITRU Negative 08/19/2018    WBCUA  03/06/2016    BACTERIA 4+ 03/06/2016    RBCUA >100 03/06/2016    BLOODU Negative 08/19/2018    SPECGRAV 1.015 08/19/2018    GLUCOSEU >=1000 08/19/2018       Radiology:  US GALLBLADDER RUQ   Final Result   Mildly dilated common bile duct measuring 9 mm in diameter. No evidence of   intrahepatic biliary ductal dilatation. This may be secondary to a common   bile duct stone. However, recommend correlation with LFTs. Cholelithiasis without evidence of acute cholecystitis. Right intrarenal stones without evidence of hydronephrosis. Hepatic steatosis. MRI ABDOMEN WO CONTRAST MRCP   Final Result   1. Cholelithiasis. No evidence of acute inflammation to suggest acute   cholecystitis. 2. Normal MRCP.   No

## 2018-08-24 ENCOUNTER — APPOINTMENT (OUTPATIENT)
Dept: GENERAL RADIOLOGY | Age: 63
DRG: 418 | End: 2018-08-24
Payer: COMMERCIAL

## 2018-08-24 ENCOUNTER — ANESTHESIA (OUTPATIENT)
Dept: OPERATING ROOM | Age: 63
DRG: 418 | End: 2018-08-24
Payer: COMMERCIAL

## 2018-08-24 ENCOUNTER — ANESTHESIA EVENT (OUTPATIENT)
Dept: OPERATING ROOM | Age: 63
DRG: 418 | End: 2018-08-24
Payer: COMMERCIAL

## 2018-08-24 VITALS
RESPIRATION RATE: 6 BRPM | OXYGEN SATURATION: 100 % | SYSTOLIC BLOOD PRESSURE: 138 MMHG | DIASTOLIC BLOOD PRESSURE: 69 MMHG

## 2018-08-24 LAB
ALBUMIN SERPL-MCNC: 3.6 G/DL (ref 3.4–5)
ALP BLD-CCNC: 226 U/L (ref 40–129)
ALT SERPL-CCNC: 296 U/L (ref 10–40)
ANION GAP SERPL CALCULATED.3IONS-SCNC: 14 MMOL/L (ref 3–16)
AST SERPL-CCNC: 229 U/L (ref 15–37)
BASOPHILS ABSOLUTE: 0 K/UL (ref 0–0.2)
BASOPHILS RELATIVE PERCENT: 0.2 %
BILIRUB SERPL-MCNC: 2.1 MG/DL (ref 0–1)
BILIRUBIN DIRECT: 1.5 MG/DL (ref 0–0.3)
BILIRUBIN, INDIRECT: 0.6 MG/DL (ref 0–1)
BUN BLDV-MCNC: 9 MG/DL (ref 7–20)
CALCIUM SERPL-MCNC: 8.8 MG/DL (ref 8.3–10.6)
CHLORIDE BLD-SCNC: 105 MMOL/L (ref 99–110)
CO2: 22 MMOL/L (ref 21–32)
CREAT SERPL-MCNC: <0.5 MG/DL (ref 0.6–1.2)
EOSINOPHILS ABSOLUTE: 0 K/UL (ref 0–0.6)
EOSINOPHILS RELATIVE PERCENT: 0 %
GFR AFRICAN AMERICAN: >60
GFR NON-AFRICAN AMERICAN: >60
GLUCOSE BLD-MCNC: 120 MG/DL (ref 70–99)
GLUCOSE BLD-MCNC: 144 MG/DL (ref 70–99)
GLUCOSE BLD-MCNC: 147 MG/DL (ref 70–99)
GLUCOSE BLD-MCNC: 148 MG/DL (ref 70–99)
GLUCOSE BLD-MCNC: 188 MG/DL (ref 70–99)
HCT VFR BLD CALC: 37.2 % (ref 36–48)
HEMOGLOBIN: 12.6 G/DL (ref 12–16)
HERPES SIMPLEX VIRUS BY PCR: NOT DETECTED
HSV SOURCE: NORMAL
LYMPHOCYTES ABSOLUTE: 1.2 K/UL (ref 1–5.1)
LYMPHOCYTES RELATIVE PERCENT: 11.8 %
MCH RBC QN AUTO: 30.8 PG (ref 26–34)
MCHC RBC AUTO-ENTMCNC: 33.8 G/DL (ref 31–36)
MCV RBC AUTO: 91 FL (ref 80–100)
MONOCYTES ABSOLUTE: 0.7 K/UL (ref 0–1.3)
MONOCYTES RELATIVE PERCENT: 7.1 %
NEUTROPHILS ABSOLUTE: 8.3 K/UL (ref 1.7–7.7)
NEUTROPHILS RELATIVE PERCENT: 80.9 %
PDW BLD-RTO: 13.7 % (ref 12.4–15.4)
PERFORMED ON: ABNORMAL
PLATELET # BLD: 328 K/UL (ref 135–450)
PMV BLD AUTO: 8 FL (ref 5–10.5)
POTASSIUM SERPL-SCNC: 4.3 MMOL/L (ref 3.5–5.1)
RBC # BLD: 4.09 M/UL (ref 4–5.2)
SODIUM BLD-SCNC: 141 MMOL/L (ref 136–145)
TOTAL PROTEIN: 6.8 G/DL (ref 6.4–8.2)
WBC # BLD: 10.3 K/UL (ref 4–11)

## 2018-08-24 PROCEDURE — 80048 BASIC METABOLIC PNL TOTAL CA: CPT

## 2018-08-24 PROCEDURE — 1200000000 HC SEMI PRIVATE

## 2018-08-24 PROCEDURE — 2580000003 HC RX 258: Performed by: NURSE ANESTHETIST, CERTIFIED REGISTERED

## 2018-08-24 PROCEDURE — 6370000000 HC RX 637 (ALT 250 FOR IP): Performed by: INTERNAL MEDICINE

## 2018-08-24 PROCEDURE — 80076 HEPATIC FUNCTION PANEL: CPT

## 2018-08-24 PROCEDURE — 2709999900 HC NON-CHARGEABLE SUPPLY: Performed by: INTERNAL MEDICINE

## 2018-08-24 PROCEDURE — 7100000001 HC PACU RECOVERY - ADDTL 15 MIN: Performed by: INTERNAL MEDICINE

## 2018-08-24 PROCEDURE — 2500000003 HC RX 250 WO HCPCS: Performed by: INTERNAL MEDICINE

## 2018-08-24 PROCEDURE — 3609014900 HC ERCP W/SPHINCTEROTOMY &/OR PAPILLOTOMY: Performed by: INTERNAL MEDICINE

## 2018-08-24 PROCEDURE — S0028 INJECTION, FAMOTIDINE, 20 MG: HCPCS | Performed by: INTERNAL MEDICINE

## 2018-08-24 PROCEDURE — 6360000002 HC RX W HCPCS: Performed by: ANESTHESIOLOGY

## 2018-08-24 PROCEDURE — 2500000003 HC RX 250 WO HCPCS: Performed by: NURSE ANESTHETIST, CERTIFIED REGISTERED

## 2018-08-24 PROCEDURE — 3700000001 HC ADD 15 MINUTES (ANESTHESIA): Performed by: INTERNAL MEDICINE

## 2018-08-24 PROCEDURE — 74330 X-RAY BILE/PANC ENDOSCOPY: CPT

## 2018-08-24 PROCEDURE — 94761 N-INVAS EAR/PLS OXIMETRY MLT: CPT

## 2018-08-24 PROCEDURE — 2700000000 HC OXYGEN THERAPY PER DAY

## 2018-08-24 PROCEDURE — 3609015200 HC ERCP REMOVE CALCULI/DEBRIS BILIARY/PANCREAS DUCT: Performed by: INTERNAL MEDICINE

## 2018-08-24 PROCEDURE — 36415 COLL VENOUS BLD VENIPUNCTURE: CPT

## 2018-08-24 PROCEDURE — 3700000000 HC ANESTHESIA ATTENDED CARE: Performed by: INTERNAL MEDICINE

## 2018-08-24 PROCEDURE — 3609018800 HC ERCP DX COLLECTION SPECIMEN BRUSHING/WASHING: Performed by: INTERNAL MEDICINE

## 2018-08-24 PROCEDURE — 85025 COMPLETE CBC W/AUTO DIFF WBC: CPT

## 2018-08-24 PROCEDURE — C1769 GUIDE WIRE: HCPCS | Performed by: INTERNAL MEDICINE

## 2018-08-24 PROCEDURE — 7100000000 HC PACU RECOVERY - FIRST 15 MIN: Performed by: INTERNAL MEDICINE

## 2018-08-24 PROCEDURE — 3209999900 FLUORO FOR SURGICAL PROCEDURES

## 2018-08-24 PROCEDURE — 6370000000 HC RX 637 (ALT 250 FOR IP): Performed by: NURSE PRACTITIONER

## 2018-08-24 PROCEDURE — 6360000002 HC RX W HCPCS: Performed by: NURSE ANESTHETIST, CERTIFIED REGISTERED

## 2018-08-24 PROCEDURE — 99024 POSTOP FOLLOW-UP VISIT: CPT | Performed by: SURGERY

## 2018-08-24 PROCEDURE — 2720000010 HC SURG SUPPLY STERILE: Performed by: INTERNAL MEDICINE

## 2018-08-24 RX ORDER — PROPOFOL 10 MG/ML
INJECTION, EMULSION INTRAVENOUS PRN
Status: DISCONTINUED | OUTPATIENT
Start: 2018-08-24 | End: 2018-08-24 | Stop reason: SDUPTHER

## 2018-08-24 RX ORDER — HYDRALAZINE HYDROCHLORIDE 20 MG/ML
5 INJECTION INTRAMUSCULAR; INTRAVENOUS ONCE
Status: COMPLETED | OUTPATIENT
Start: 2018-08-24 | End: 2018-08-24

## 2018-08-24 RX ORDER — SODIUM CHLORIDE 9 MG/ML
INJECTION, SOLUTION INTRAVENOUS CONTINUOUS PRN
Status: DISCONTINUED | OUTPATIENT
Start: 2018-08-24 | End: 2018-08-24 | Stop reason: SDUPTHER

## 2018-08-24 RX ORDER — ROCURONIUM BROMIDE 10 MG/ML
INJECTION, SOLUTION INTRAVENOUS PRN
Status: DISCONTINUED | OUTPATIENT
Start: 2018-08-24 | End: 2018-08-24 | Stop reason: SDUPTHER

## 2018-08-24 RX ORDER — MIDAZOLAM HYDROCHLORIDE 1 MG/ML
INJECTION INTRAMUSCULAR; INTRAVENOUS PRN
Status: DISCONTINUED | OUTPATIENT
Start: 2018-08-24 | End: 2018-08-24 | Stop reason: SDUPTHER

## 2018-08-24 RX ADMIN — SODIUM CHLORIDE: 9 INJECTION, SOLUTION INTRAVENOUS at 13:25

## 2018-08-24 RX ADMIN — ROCURONIUM BROMIDE 50 MG: 10 SOLUTION INTRAVENOUS at 13:36

## 2018-08-24 RX ADMIN — HYDRALAZINE HYDROCHLORIDE 5 MG: 20 INJECTION INTRAMUSCULAR; INTRAVENOUS at 17:25

## 2018-08-24 RX ADMIN — PROPOFOL 200 MG: 10 INJECTION, EMULSION INTRAVENOUS at 13:36

## 2018-08-24 RX ADMIN — INSULIN LISPRO 1 UNITS: 100 INJECTION, SOLUTION INTRAVENOUS; SUBCUTANEOUS at 22:10

## 2018-08-24 RX ADMIN — METFORMIN HYDROCHLORIDE 500 MG: 500 TABLET ORAL at 17:19

## 2018-08-24 RX ADMIN — POTASSIUM CHLORIDE 20 MEQ: 20 TABLET, EXTENDED RELEASE ORAL at 17:19

## 2018-08-24 RX ADMIN — FAMOTIDINE 20 MG: 10 INJECTION, SOLUTION INTRAVENOUS at 22:09

## 2018-08-24 RX ADMIN — METOPROLOL TARTRATE 50 MG: 50 TABLET ORAL at 22:09

## 2018-08-24 RX ADMIN — INDOMETHACIN 50 MG: 50 SUPPOSITORY RECTAL at 14:18

## 2018-08-24 RX ADMIN — GLUCAGON HYDROCHLORIDE 0.3 MG: KIT at 13:50

## 2018-08-24 RX ADMIN — HYDRALAZINE HYDROCHLORIDE 50 MG: 25 TABLET, FILM COATED ORAL at 22:09

## 2018-08-24 RX ADMIN — METOPROLOL TARTRATE 50 MG: 50 TABLET ORAL at 08:38

## 2018-08-24 RX ADMIN — FAMOTIDINE 20 MG: 10 INJECTION, SOLUTION INTRAVENOUS at 08:38

## 2018-08-24 RX ADMIN — MIDAZOLAM HYDROCHLORIDE 2 MG: 2 INJECTION, SOLUTION INTRAMUSCULAR; INTRAVENOUS at 13:28

## 2018-08-24 ASSESSMENT — PULMONARY FUNCTION TESTS
PIF_VALUE: 0
PIF_VALUE: 25
PIF_VALUE: 2
PIF_VALUE: 0
PIF_VALUE: 2
PIF_VALUE: 22
PIF_VALUE: 24
PIF_VALUE: 2
PIF_VALUE: 25
PIF_VALUE: 0
PIF_VALUE: 1
PIF_VALUE: 1
PIF_VALUE: 24
PIF_VALUE: 23
PIF_VALUE: 0
PIF_VALUE: 24
PIF_VALUE: 1
PIF_VALUE: 2
PIF_VALUE: 25
PIF_VALUE: 20
PIF_VALUE: 24
PIF_VALUE: 1
PIF_VALUE: 5
PIF_VALUE: 21
PIF_VALUE: 24
PIF_VALUE: 0
PIF_VALUE: 20
PIF_VALUE: 26
PIF_VALUE: 25
PIF_VALUE: 23
PIF_VALUE: 23
PIF_VALUE: 0
PIF_VALUE: 24
PIF_VALUE: 24
PIF_VALUE: 21
PIF_VALUE: 25

## 2018-08-24 ASSESSMENT — PAIN SCALES - GENERAL
PAINLEVEL_OUTOF10: 0

## 2018-08-24 NOTE — ANESTHESIA PRE PROCEDURE
08/23/18 1246    hydrALAZINE (APRESOLINE) tablet 50 mg  50 mg Oral 3 times per day Best Sanchez MD   50 mg at 08/23/18 2330    morphine injection 2 mg  2 mg Intravenous Q2H PRN Markel Harrison MD        Or    morphine (PF) injection 4 mg  4 mg Intravenous Q2H PRN Markel Harrison MD        potassium chloride [place] [facility]) extended release tablet 20 mEq  20 mEq Oral BID  Best Sanchez MD   20 mEq at 08/23/18 2330    insulin lispro (HUMALOG) injection vial 0-6 Units  0-6 Units Subcutaneous TID  YANETH Lehman - CNP   1 Units at 08/21/18 1830    insulin lispro (HUMALOG) injection vial 0-3 Units  0-3 Units Subcutaneous Nightly YANETH Lehman - CNP   1 Units at 08/23/18 2332    oxyCODONE (ROXICODONE) immediate release tablet 5 mg  5 mg Oral Q4H PRN Best Sanchez MD        hydrochlorothiazide (HYDRODIURIL) tablet 25 mg  25 mg Oral Daily Best Sanchez MD   Stopped at 08/23/18 0841    metFORMIN (GLUCOPHAGE) tablet 500 mg  500 mg Oral BID  Best Sanchez MD   Stopped at 08/23/18 0842    0.9 % sodium chloride infusion   Intravenous Continuous Best Sanchez MD 50 mL/hr at 08/23/18 1942      losartan (COZAAR) tablet 100 mg  100 mg Oral Daily Best Sanchez MD   100 mg at 08/23/18 3686    metoprolol tartrate (LOPRESSOR) tablet 50 mg  50 mg Oral BID Best Sanchez MD   50 mg at 08/24/18 2151    meclizine (ANTIVERT) tablet 25 mg  25 mg Oral TID PRN Best Sanchez MD        sodium chloride flush 0.9 % injection 10 mL  10 mL Intravenous 2 times per day Best Sanchez MD   10 mL at 08/22/18 2038    sodium chloride flush 0.9 % injection 10 mL  10 mL Intravenous PRN Best Sanchez MD   10 mL at 08/23/18 0923    magnesium hydroxide (MILK OF MAGNESIA) 400 MG/5ML suspension 30 mL  30 mL Oral Daily PRN Best Sanchez MD        ondansetron [facility]) injection 4 mg  4 mg Intravenous Q6H PRN Best Sanchez MD   4 mg at 08/23/18 2210    enoxaparin (LOVENOX) injection 40 mg  40 mg  08/24/2018     08/24/2018       POC Tests:   Recent Labs      08/24/18   1143   POCGLU  120*       Coags:   Lab Results   Component Value Date    PROTIME 12.5 08/20/2018    INR 1.10 08/20/2018    APTT 28.9 05/29/2013       HCG (If Applicable): No results found for: PREGTESTUR, PREGSERUM, HCG, HCGQUANT     ABGs: No results found for: PHART, PO2ART, ZQC4ASY, SHR9DUL, BEART, [id number]     Type & Screen (If Applicable):  Lab Results   Component Value Date    LABABO A 12/17/2010    [address] Positive 12/17/2010       Anesthesia Evaluation  Patient summary reviewed and Nursing notes reviewed no history of anesthetic complications:   Airway: Mallampati: III  TM distance: >3 FB   Neck ROM: full  Mouth opening: > = 3 FB Dental:          Pulmonary:Negative Pulmonary ROS and normal exam    (+) sleep apnea:                             Cardiovascular:Negative CV ROS    (+) hypertension:,                ROS comment: No chest pain. > 4 mets. No anticoagulation. Neuro/Psych:   Negative Neuro/Psych ROS              GI/Hepatic/Renal: Neg GI/Hepatic/Renal ROS       (-) hiatal hernia and GERD       Endo/Other: Negative Endo/Other ROS   (+) Diabetes, hypothyroidism::., .                 Abdominal:           Vascular:                                     NPO > MN    Pre-Operative Diagnosis: Abdominal pain [R10.9]; Abdominal pain [R10.9]    66 y.o.   BMI:  Body mass index is 38.59 kg/m².      Vitals:    08/24/18 0226 08/24/18 0411 08/24/18 0415 08/24/18 0725   BP: (!) 145/82 (!) 156/70  (!) 169/79   Pulse: 62 64  67   Resp: 18 20 18   Temp: 97.6 °F (36.4 °C) 98.4 °F (36.9 °C)  98.2 °F (36.8 °C)   TempSrc: Tympanic Oral  Oral   SpO2: 99% 95% 97% 95%   Weight:       Height:           Allergies   Allergen Reactions    Amlodipine Besylate Swelling     EDEMA    Naproxen Sodium Other (See Comments)     edema    Nsaids Swelling    Penicillins Other (See Comments)     Pt unsure of reaction    Streptomycin Other (See
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
## 2018-08-24 NOTE — PLAN OF CARE
Problem: Falls - Risk of:  Goal: Will remain free from falls  Will remain free from falls   Bed in low position, side rails up  X2,encouraged to call before getting out of bed,verbalized understanding. Call light remains in reach. bed alarm remains activated, nonskid socks on

## 2018-08-24 NOTE — ANESTHESIA POSTPROCEDURE EVALUATION
Department of Anesthesiology  Postprocedure Note    Patient: Willian Wilder  MRN: [id number]  YOB: 1955  Date of evaluation: 8/23/2018  Time:  9:47 PM     Procedure Summary     Date:  08/23/18 Room / Location:  [facility] 02 / [facility]    Anesthesia Start:  6749 Anesthesia Stop:  1914    Procedure:  LAPAROSCOPIC CHOLECYSTECTOMY WITH INTRAOPERATIVE CHOLANGIOGRAM (N/A ) Diagnosis:  (CHOLECYSTITIS)    Surgeon:  Jamin Beckwith MD Responsible Provider:  Davian López MD    Anesthesia Type:  general ASA Status:  3          Anesthesia Type: general    Segundo Phase I: Segundo Score: 8    Segundo Phase II:      Last vitals: Reviewed and per EMR flowsheets.        Anesthesia Post Evaluation    Comments: Postoperative Anesthesia Note    Name:    Willian Wilder  MRN:      [id number]    Patient Vitals in the past 12 hrs:  08/23/18 2109, BP:(!) 163/89, Temp:98.4 °F (36.9 °C), Temp src:Oral, Pulse:73, Resp:20, SpO2:96 %  08/23/18 2047, BP:133/64, Pulse:71, Resp:13, SpO2:94 %  08/23/18 2043, BP:128/65, Pulse:80, Resp:14, SpO2:97 %  08/23/18 2038, BP:(!) 151/56, Pulse:70, Resp:12, SpO2:96 %  08/23/18 2033, BP:(!) 156/57, Pulse:72, Resp:14, SpO2:95 %  08/23/18 2010, BP:(!) 145/70, Pulse:77, Resp:14, SpO2:96 %  08/23/18 2000, BP:(!) 163/84, Pulse:70, Resp:13, SpO2:95 %  08/23/18 1952, BP:123/64, Temp:99.2 °F (37.3 °C), Temp src:Temporal, Pulse:79, Resp:10, SpO2:95 %  08/23/18 1947, BP:98/77, Pulse:83, Resp:14, SpO2:95 %  08/23/18 1942, BP:(!) 177/84, Pulse:84, Resp:16, SpO2:95 %  08/23/18 1937, BP:(!) 178/71, Pulse:80, Resp:16, SpO2:95 %  08/23/18 1932, BP:(!) 183/76, Pulse:81, Resp:18, SpO2:95 %  08/23/18 1925, BP:(!) 188/82, Pulse:85, Resp:19, SpO2:96 %  08/23/18 1910, BP:(!) 186/84, Temp:98.9 °F (37.2 °C), Temp src:Temporal, Pulse:90, Resp:14, SpO2:96 %  08/23/18 1452, BP:(!) 188/105, Pulse:66  08/23/18 1451, BP:(!) 171/102, Temp:97.6 °F (36.4 °C), Temp src:Oral, Pulse:64, Resp:16, SpO2:99 %  08/23/18 1425,

## 2018-08-24 NOTE — PROGRESS NOTES
Hospitalist Progress Note      PCP: Tiara Santamaria MD    Date of Admission: 8/19/2018    Chief Complaint: Abdominal pain    Hospital Course:      Subjective:   Patient is up in chair, comfortable, not in distress. Complaining of nausea and abdominal discomfort after diet. No new event overnight noted. Medications:  Reviewed    Infusion Medications    sodium chloride 50 mL/hr at 08/23/18 1942    dextrose       Scheduled Medications    hydrALAZINE  50 mg Oral 3 times per day    potassium chloride  20 mEq Oral BID WC    insulin lispro  0-6 Units Subcutaneous TID WC    insulin lispro  0-3 Units Subcutaneous Nightly    hydrochlorothiazide  25 mg Oral Daily    metFORMIN  500 mg Oral BID WC    losartan  100 mg Oral Daily    metoprolol tartrate  50 mg Oral BID    sodium chloride flush  10 mL Intravenous 2 times per day    enoxaparin  40 mg Subcutaneous Daily    famotidine (PEPCID) injection  20 mg Intravenous BID    levothyroxine  125 mcg Oral Daily     PRN Meds: hydrALAZINE, morphine **OR** morphine, oxyCODONE, meclizine, sodium chloride flush, magnesium hydroxide, ondansetron, glucose, dextrose, glucagon (rDNA), dextrose, morphine      Intake/Output Summary (Last 24 hours) at 08/24/18 1144  Last data filed at 08/24/18 0720   Gross per 24 hour   Intake           4249.5 ml   Output             1700 ml   Net           2549.5 ml       Physical Exam Performed:    BP (!) 169/79   Pulse 67   Temp 98.2 °F (36.8 °C) (Oral)   Resp 18   Ht 5' 2\" (1.575 m)   Wt 211 lb (95.7 kg)   LMP 12/16/2005   SpO2 95%   BMI 38.59 kg/m²     General appearance: No apparent distress, appears stated age and cooperative. Obese  HEENT: Pupils equal, round, and reactive to light. Conjunctivae/corneas clear. Neck: Supple, with full range of motion. No jugular venous distention. Trachea midline. Respiratory:  Normal respiratory effort.  Clear to auscultation, bilaterally without could not tolerate advancement of diet, further plan as per general surgery.     Cholelithiasis without evidence of infection, see plan as above, general surgery consulted, will monitor.     Diabetes mellitus type 2, uncontrolled, started on insulin sliding scale, monitor blood sugar. HTN, uncontrolled, continue home regimen, add Hydralazin, patient is allergic to Norvasc, add prn Hydralazin , monitor.     Morbid Obesity complicating assessment and treatment. Placing patient at risk for multiple co-morbidities as well as early death and contributing to the patient's presentation. Counseled on weight loss. Body mass index is 38.59 kg/m².     DVT Prophylaxis: Lovenox  Diet: Diet NPO Effective Now  Code Status: Full Code    PT/OT Eval Status: Ambulatory    Dispo - in Koffi Leon MD

## 2018-08-25 VITALS
RESPIRATION RATE: 14 BRPM | HEIGHT: 62 IN | OXYGEN SATURATION: 93 % | BODY MASS INDEX: 38.83 KG/M2 | SYSTOLIC BLOOD PRESSURE: 132 MMHG | DIASTOLIC BLOOD PRESSURE: 61 MMHG | TEMPERATURE: 97.7 F | HEART RATE: 70 BPM | WEIGHT: 211 LBS

## 2018-08-25 LAB
ALBUMIN SERPL-MCNC: 3.3 G/DL (ref 3.4–5)
ALP BLD-CCNC: 173 U/L (ref 40–129)
ALT SERPL-CCNC: 220 U/L (ref 10–40)
AST SERPL-CCNC: 112 U/L (ref 15–37)
BILIRUB SERPL-MCNC: 0.9 MG/DL (ref 0–1)
BILIRUBIN DIRECT: 0.4 MG/DL (ref 0–0.3)
BILIRUBIN, INDIRECT: 0.5 MG/DL (ref 0–1)
GLUCOSE BLD-MCNC: 143 MG/DL (ref 70–99)
GLUCOSE BLD-MCNC: 183 MG/DL (ref 70–99)
PERFORMED ON: ABNORMAL
PERFORMED ON: ABNORMAL
TOTAL PROTEIN: 6.5 G/DL (ref 6.4–8.2)

## 2018-08-25 PROCEDURE — 36415 COLL VENOUS BLD VENIPUNCTURE: CPT

## 2018-08-25 PROCEDURE — 6370000000 HC RX 637 (ALT 250 FOR IP): Performed by: INTERNAL MEDICINE

## 2018-08-25 PROCEDURE — 2580000003 HC RX 258: Performed by: INTERNAL MEDICINE

## 2018-08-25 PROCEDURE — S0028 INJECTION, FAMOTIDINE, 20 MG: HCPCS | Performed by: INTERNAL MEDICINE

## 2018-08-25 PROCEDURE — 2500000003 HC RX 250 WO HCPCS: Performed by: INTERNAL MEDICINE

## 2018-08-25 PROCEDURE — 80076 HEPATIC FUNCTION PANEL: CPT

## 2018-08-25 RX ADMIN — METOPROLOL TARTRATE 50 MG: 50 TABLET ORAL at 09:22

## 2018-08-25 RX ADMIN — METFORMIN HYDROCHLORIDE 500 MG: 500 TABLET ORAL at 09:21

## 2018-08-25 RX ADMIN — FAMOTIDINE 20 MG: 10 INJECTION, SOLUTION INTRAVENOUS at 09:21

## 2018-08-25 RX ADMIN — HYDROCHLOROTHIAZIDE 25 MG: 25 TABLET ORAL at 09:22

## 2018-08-25 RX ADMIN — POTASSIUM CHLORIDE 20 MEQ: 20 TABLET, EXTENDED RELEASE ORAL at 09:22

## 2018-08-25 RX ADMIN — SODIUM CHLORIDE, PRESERVATIVE FREE 10 ML: 5 INJECTION INTRAVENOUS at 09:21

## 2018-08-25 RX ADMIN — SODIUM CHLORIDE: 9 INJECTION, SOLUTION INTRAVENOUS at 01:22

## 2018-08-25 RX ADMIN — LOSARTAN POTASSIUM 100 MG: 100 TABLET, FILM COATED ORAL at 09:21

## 2018-08-25 RX ADMIN — HYDRALAZINE HYDROCHLORIDE 50 MG: 25 TABLET, FILM COATED ORAL at 06:05

## 2018-08-25 RX ADMIN — LEVOTHYROXINE SODIUM 125 MCG: 125 TABLET ORAL at 06:05

## 2018-08-25 NOTE — PLAN OF CARE
Problem: Pain:  Goal: Patient's pain/discomfort is manageable  Patient's pain/discomfort is manageable   Pt using pain scale 0-10 to rate pain . Instructed to call with increasing pain, pain unrelieved or when needs pain med. call light within reach.  Will continue to monitor

## 2018-08-25 NOTE — PROGRESS NOTES
IV removed, no complications. Pt verbalized understanding of d/c instructions and education. No scripts needed.  Pt taken out by w/c to be driven home by her  Real Noon

## 2018-08-25 NOTE — DISCHARGE SUMMARY
08/24/2018    K 4.3 08/24/2018    K 3.1 08/20/2018     08/24/2018    CO2 22 08/24/2018    BUN 9 08/24/2018    CREATININE <0.5 08/24/2018    CALCIUM 8.8 08/24/2018    PHOS 2.5 08/21/2018         Significant Diagnostic Studies    Radiology:   FL ERCP BILIARY AND PANCREATIC S&I   Final Result   Lucency over the mid common duct, suggestive of choledocholithiasis. Air   artifact may be present as well. Please refer to procedure notes for   additional details. FL CHOLANGIOGRAM OR   Final Result   Filling defect within the distal common duct, nonspecific, but concerning for   choledocholithiasis. US GALLBLADDER RUQ   Final Result   Mildly dilated common bile duct measuring 9 mm in diameter. No evidence of   intrahepatic biliary ductal dilatation. This may be secondary to a common   bile duct stone. However, recommend correlation with LFTs. Cholelithiasis without evidence of acute cholecystitis. Right intrarenal stones without evidence of hydronephrosis. Hepatic steatosis. MRI ABDOMEN WO CONTRAST MRCP   Final Result   1. Cholelithiasis. No evidence of acute inflammation to suggest acute   cholecystitis. 2. Normal MRCP. No current evidence of biliary ductal dilatation. 3. Hepatic steatosis and left adrenal adenoma incidentally observed.          FLUORO FOR SURGICAL PROCEDURES    (Results Pending)   FLUORO FOR SURGICAL PROCEDURES    (Results Pending)          Consults:     IP CONSULT TO HOSPITALIST  IP CONSULT TO GI  IP CONSULT TO GENERAL SURGERY    Disposition:  home     Condition at Discharge: Stable    Discharge Instructions/Follow-up:  PCP 1 week to have repeat LFT and renal panel    Code Status:  Full Code     Activity: activity as tolerated    Diet: diabetic diet      Discharge Medications:     Current Discharge Medication List           Details   levothyroxine (SYNTHROID) 125 MCG tablet Take 1 tablet by mouth Daily  Qty: 30 tablet, Refills: 3              Details

## 2018-08-25 NOTE — PROGRESS NOTES
Recent Labs      08/23/18   0608  08/24/18   0649  08/25/18   0637   AST  131*  229*  112*   ALT  285*  296*  220*   PROT  6.3*  6.8  6.5   BILIDIR  0.4*  1.5*  0.4*   BILITOT  1.0  2.1*  0.9   ALKPHOS  132*  226*  173*     PT/INR: No results for input(s): INR in the last 72 hours. Invalid input(s): PT    IMAGING:      Impression:S/p choly ERCP ES and stone remoaval. Tolerating diet    Recommendation:  1.  Discharge home      Sera Lamar MD  8:30 AM 8/25/2018

## 2018-08-28 NOTE — OP NOTE
[facility], [place] [address]                                 OPERATIVE REPORT    PATIENT NAME: Saroj Santoyo                    :        1955  MED REC NO:   [id number]                          ROOM:       0543  ACCOUNT NO:   [de-identified]                           ADMIT DATE: 2018  PROVIDER:     Gerrianne Kussmaul, MD    DATE OF PROCEDURE:  2018    PREOPERATIVE DIAGNOSIS:  Cholecystitis. POSTOPERATIVE DIAGNOSES:  Cholecystitis, choledocholithiasis. PROCEDURE PERFORMED:  Laparoscopic cholecystectomy with intraoperative  cholangiogram.    SURGEON:  Gerrianne Kussmaul, MD    ANESTHESIA:  General.    EBL:  Less than 50 mL. SPECIMEN:  Gallbladder with contents. COUNTS:  Sponge and needle count correct. INDICATIONS:  The patient is a 60-year-old female, who was admitted with  signs and symptoms of biliary colic as well as some elevations in her liver  function tests concerning for possible common duct stones. An MRCP that  has been done preoperatively did not demonstrate a visible common bile duct  stone. However, the patient remained symptomatic every time she tried  eating light food, so we planned to proceed with surgery with this  admission. FINDINGS:  1. Distended, minimally inflamed gallbladder with multiple small (5 to 10  mm) and one large (2 cm) stone. 2.  Cholangiogram showing a distal filling defect consistent with a stone,  but with flow into the duodenum. DESCRIPTION OF PROCEDURE:  After informed consent was obtained, the patient  was taken to operating room, placed in the supine position. Antithrombotic  pumps were placed on the legs. General anesthesia was administered without  difficulty. The abdomen was prepped and draped in sterile fashion. A 5-mm  trocar incision was made in the left upper quadrant just off the midline  after infiltrating with local anesthesia.   A trocar with a 0 degree
status post sphincterotomy and  removal.  If she is able to tolerate a low-fat diet tonight, she can be  discharged later this evening.         Yuliya Martinez MD    D: 08/27/2018 15:30:27       T: 08/28/2018 0:30:54     [email or web address]  Job#: [id number]     Doc#: [id number]    CC:  Corrine Perez MD

## 2018-09-07 ENCOUNTER — OFFICE VISIT (OUTPATIENT)
Dept: SURGERY | Age: 63
End: 2018-09-07

## 2018-09-07 VITALS
HEIGHT: 62 IN | HEART RATE: 58 BPM | SYSTOLIC BLOOD PRESSURE: 137 MMHG | DIASTOLIC BLOOD PRESSURE: 69 MMHG | BODY MASS INDEX: 38.39 KG/M2 | WEIGHT: 208.6 LBS

## 2018-09-07 DIAGNOSIS — Z09 POSTOP CHECK: ICD-10-CM

## 2018-09-07 PROCEDURE — 99024 POSTOP FOLLOW-UP VISIT: CPT | Performed by: SURGERY

## 2018-09-07 NOTE — PATIENT INSTRUCTIONS
· Continue with routine wound care as discussed  · Gradually increase activities as tolerated  · Follow-up as needed; please call with questions or concerns  · Use anti-diarrheals as needed.   Call if diarrhea persists in 2-3 months  · Stay hydrated until diarrhea resolves

## 2018-10-07 PROBLEM — Z09 POSTOP CHECK: Status: RESOLVED | Noted: 2018-09-07 | Resolved: 2018-10-07

## (undated) DEVICE — GLOVE,SURG,TRIUMPH MICRO,LTX,PF,7.5: Brand: MEDLINE

## (undated) DEVICE — 35 ML SYRINGE LUER-LOCK TIP: Brand: MONOJECT

## (undated) DEVICE — SYRINGE, LUER LOCK, 10ML: Brand: MEDLINE

## (undated) DEVICE — GUIDEWIRE ENDOSCP WRK L4500MM COAT L70MM DIA0025IN FLUORINE

## (undated) DEVICE — Device

## (undated) DEVICE — SINGLE USE 3-LUMEN EXTRACTION BALLOON V: Brand: SINGLE USE 3-LUMEN EXTRACTION BALLOON V

## (undated) DEVICE — ELECTRODE PT RET AD L9FT HI MOIST COND ADH HYDRGEL CORDED

## (undated) DEVICE — CANNULATING SPHINCTEROTOME: Brand: TRUETOME 39

## (undated) DEVICE — TROCAR: Brand: KII SLEEVE

## (undated) DEVICE — MEDIA CONTRAST OPTIRAY 320 50ML VIAL

## (undated) DEVICE — SUTURE VCRL + SZ 3-0 L18IN ABSRB UD SH 1/2 CIR TAPERCUT NDL VCP864D

## (undated) DEVICE — INDICATED FOR USE DURING OPEN AND LAPAROSCOPIC CHOLECYSTECTOMY PROCEDURES TO INJECT RADIOPAQUE MEDIA THROUGH THE CYSTIC DUCT INTO THE BILIARY TREE.: Brand: AEROSTAT®

## (undated) DEVICE — SOLUTION IV 1000ML LAC RINGERS PH 6.5 INJ USP VIAFLX PLAS

## (undated) DEVICE — TROCAR: Brand: KII FIOS FIRST ENTRY

## (undated) DEVICE — PUMP SUC IRR TBNG L10FT W/ HNDPC ASSEMB STRYKEFLOW 2

## (undated) DEVICE — C-ARM: Brand: UNBRANDED

## (undated) DEVICE — TISSUE RETRIEVAL SYSTEM: Brand: INZII RETRIEVAL SYSTEM

## (undated) DEVICE — Z INACTIVE USE 2641839 CLIP INT M L POLYMER LOK LIG HEM O LOK